# Patient Record
Sex: MALE | Race: WHITE | NOT HISPANIC OR LATINO | Employment: OTHER | URBAN - METROPOLITAN AREA
[De-identification: names, ages, dates, MRNs, and addresses within clinical notes are randomized per-mention and may not be internally consistent; named-entity substitution may affect disease eponyms.]

---

## 2020-07-02 ENCOUNTER — APPOINTMENT (OUTPATIENT)
Dept: RADIOLOGY | Facility: OTHER | Age: 60
End: 2020-07-02
Payer: COMMERCIAL

## 2020-07-02 VITALS
WEIGHT: 187 LBS | BODY MASS INDEX: 25.33 KG/M2 | SYSTOLIC BLOOD PRESSURE: 164 MMHG | DIASTOLIC BLOOD PRESSURE: 90 MMHG | HEART RATE: 60 BPM | HEIGHT: 72 IN

## 2020-07-02 DIAGNOSIS — M17.11 PRIMARY OSTEOARTHRITIS OF RIGHT KNEE: ICD-10-CM

## 2020-07-02 DIAGNOSIS — R52 PAIN: ICD-10-CM

## 2020-07-02 DIAGNOSIS — M25.561 CHRONIC PAIN OF RIGHT KNEE: Primary | ICD-10-CM

## 2020-07-02 DIAGNOSIS — G89.29 CHRONIC PAIN OF RIGHT KNEE: Primary | ICD-10-CM

## 2020-07-02 PROCEDURE — 73564 X-RAY EXAM KNEE 4 OR MORE: CPT

## 2020-07-02 PROCEDURE — 99203 OFFICE O/P NEW LOW 30 MIN: CPT | Performed by: ORTHOPAEDIC SURGERY

## 2020-07-02 PROCEDURE — 20610 DRAIN/INJ JOINT/BURSA W/O US: CPT | Performed by: ORTHOPAEDIC SURGERY

## 2020-07-02 RX ORDER — BUPIVACAINE HYDROCHLORIDE 2.5 MG/ML
2 INJECTION, SOLUTION INFILTRATION; PERINEURAL
Status: COMPLETED | OUTPATIENT
Start: 2020-07-02 | End: 2020-07-02

## 2020-07-02 RX ORDER — BETAMETHASONE SODIUM PHOSPHATE AND BETAMETHASONE ACETATE 3; 3 MG/ML; MG/ML
6 INJECTION, SUSPENSION INTRA-ARTICULAR; INTRALESIONAL; INTRAMUSCULAR; SOFT TISSUE
Status: COMPLETED | OUTPATIENT
Start: 2020-07-02 | End: 2020-07-02

## 2020-07-02 RX ADMIN — BUPIVACAINE HYDROCHLORIDE 2 ML: 2.5 INJECTION, SOLUTION INFILTRATION; PERINEURAL at 09:20

## 2020-07-02 RX ADMIN — BETAMETHASONE SODIUM PHOSPHATE AND BETAMETHASONE ACETATE 6 MG: 3; 3 INJECTION, SUSPENSION INTRA-ARTICULAR; INTRALESIONAL; INTRAMUSCULAR; SOFT TISSUE at 09:20

## 2020-07-02 NOTE — PROGRESS NOTES
Assessment  Diagnoses and all orders for this visit:    Chronic pain of right knee    Primary osteoarthritis of right knee          Discussion and Plan:    The patient has an examination consistent with right knee OA and valgus deformity  I have discussed with the patient the pathophysiology of this diagnosis and reviewed how the examination correlates with this diagnosis  Treatment options were discussed at length and after discussing these treatment options, the patient elected for right knee injection today  If CS injection does not work we can consider visco supplementation  Injection can be repeated in 4-6 mos if needed  Subjective:   Patient ID: Thierno Rubalcava is a 63 y o  male      HPI  The patient presents with a chief complaint of right knee pain  The pain began several year(s) ago and is not associated with an acute injury  The patient describes the pain as aching and dull  It is intermittent in timing, and localizes the pain to the globally  The pain is worse with walking, ascending stairs and descending stairs and relieved with rest   He denies mechanical symptoms such as locking and catching  The following portions of the patient's history were reviewed and updated as appropriate: allergies, current medications, past family history, past medical history, past social history, past surgical history and problem list     Review of Systems   Constitutional: Negative for chills and fever  HENT: Negative for drooling and hearing loss  Eyes: Negative for visual disturbance  Respiratory: Negative for cough and shortness of breath  Cardiovascular: Negative for chest pain  Gastrointestinal: Negative for abdominal pain  Skin: Negative for rash  Psychiatric/Behavioral: Negative for agitation         Objective:  /90   Pulse 60   Ht 6' (1 829 m)   Wt 84 8 kg (187 lb)   BMI 25 36 kg/m²       Right Knee Exam     Tenderness   The patient is experiencing tenderness in the medial joint line  Range of Motion   Extension: 0   Flexion: 130     Other   Erythema: absent  Sensation: normal  Pulse: present  Swelling: none  Effusion: no effusion present    Comments:    Valgus alinement  Laxity with valgus and varus stress testing            Physical Exam   Constitutional: He appears well-developed and well-nourished  HENT:   Head: Normocephalic  Eyes: Pupils are equal, round, and reactive to light  Pulmonary/Chest: Effort normal    Musculoskeletal:        Right knee: He exhibits no effusion  Skin: Skin is warm and dry  Psychiatric: He has a normal mood and affect  Vitals reviewed  Large joint arthrocentesis: R knee  Date/Time: 7/2/2020 9:20 AM  Consent given by: parent  Site marked: site marked  Timeout: Immediately prior to procedure a time out was called to verify the correct patient, procedure, equipment, support staff and site/side marked as required   Supporting Documentation  Indications: pain   Procedure Details  Location: knee - R knee  Preparation: Patient was prepped and draped in the usual sterile fashion  Needle size: 22 G  Ultrasound guidance: no  Approach: lateral  Medications administered: 2 mL bupivacaine 0 25 %; 6 mg betamethasone acetate-betamethasone sodium phosphate 6 (3-3) mg/mL    Patient tolerance: patient tolerated the procedure well with no immediate complications  Dressing:  Sterile dressing applied        I have personally reviewed pertinent films in PACS and my interpretation is as follows  Right knee x-rays demonstrates no fracture of dislocation    There is advanced osteoarthritis of the right knee with valgus deformity, the arthritis is mostly localized the lateral compartment but is present in the medial and patellofemoral compartment as well        Scribe Attestation    I,:   Victor Hugo Vera am acting as a scribe while in the presence of the attending physician :        I,:   Shireen Hayes MD personally performed the services described in this documentation    as scribed in my presence :

## 2020-07-02 NOTE — PATIENT INSTRUCTIONS

## 2020-07-24 DIAGNOSIS — M17.11 PRIMARY OSTEOARTHRITIS OF RIGHT KNEE: Primary | ICD-10-CM

## 2020-07-24 NOTE — TELEPHONE ENCOUNTER
Allan Fam Median    Patient states cortisone is not working, he would like to proceed with gel injection authorization previously discussed

## 2020-10-01 ENCOUNTER — OFFICE VISIT (OUTPATIENT)
Dept: OBGYN CLINIC | Facility: OTHER | Age: 60
End: 2020-10-01
Payer: COMMERCIAL

## 2020-10-01 VITALS
DIASTOLIC BLOOD PRESSURE: 89 MMHG | BODY MASS INDEX: 26.14 KG/M2 | HEART RATE: 54 BPM | WEIGHT: 193 LBS | SYSTOLIC BLOOD PRESSURE: 151 MMHG | HEIGHT: 72 IN

## 2020-10-01 DIAGNOSIS — M17.11 PRIMARY OSTEOARTHRITIS OF RIGHT KNEE: Primary | ICD-10-CM

## 2020-10-01 PROCEDURE — 20610 DRAIN/INJ JOINT/BURSA W/O US: CPT | Performed by: PHYSICIAN ASSISTANT

## 2021-08-05 ENCOUNTER — OFFICE VISIT (OUTPATIENT)
Dept: OBGYN CLINIC | Facility: OTHER | Age: 61
End: 2021-08-05
Payer: COMMERCIAL

## 2021-08-05 VITALS
HEART RATE: 51 BPM | SYSTOLIC BLOOD PRESSURE: 154 MMHG | BODY MASS INDEX: 26.31 KG/M2 | DIASTOLIC BLOOD PRESSURE: 87 MMHG | WEIGHT: 194 LBS

## 2021-08-05 DIAGNOSIS — M17.11 PRIMARY OSTEOARTHRITIS OF RIGHT KNEE: Primary | ICD-10-CM

## 2021-08-05 DIAGNOSIS — M17.12 PRIMARY OSTEOARTHRITIS OF LEFT KNEE: ICD-10-CM

## 2021-08-05 PROCEDURE — 20610 DRAIN/INJ JOINT/BURSA W/O US: CPT | Performed by: ORTHOPAEDIC SURGERY

## 2021-08-05 PROCEDURE — 99214 OFFICE O/P EST MOD 30 MIN: CPT | Performed by: ORTHOPAEDIC SURGERY

## 2021-08-05 RX ORDER — BETAMETHASONE SODIUM PHOSPHATE AND BETAMETHASONE ACETATE 3; 3 MG/ML; MG/ML
6 INJECTION, SUSPENSION INTRA-ARTICULAR; INTRALESIONAL; INTRAMUSCULAR; SOFT TISSUE
Status: COMPLETED | OUTPATIENT
Start: 2021-08-05 | End: 2021-08-05

## 2021-08-05 RX ORDER — BUPIVACAINE HYDROCHLORIDE 2.5 MG/ML
2 INJECTION, SOLUTION INFILTRATION; PERINEURAL
Status: COMPLETED | OUTPATIENT
Start: 2021-08-05 | End: 2021-08-05

## 2021-08-05 RX ADMIN — BETAMETHASONE SODIUM PHOSPHATE AND BETAMETHASONE ACETATE 6 MG: 3; 3 INJECTION, SUSPENSION INTRA-ARTICULAR; INTRALESIONAL; INTRAMUSCULAR; SOFT TISSUE at 10:23

## 2021-08-05 RX ADMIN — BUPIVACAINE HYDROCHLORIDE 2 ML: 2.5 INJECTION, SOLUTION INFILTRATION; PERINEURAL at 10:23

## 2021-08-05 NOTE — PROGRESS NOTES
Assessment  Diagnoses and all orders for this visit:    Primary osteoarthritis of right knee    Primary osteoarthritis of left knee        Discussion and Plan:    · Patient was provided with repeat CS injections today in to his bilateral knees for known osteoarthritis  This is documented appropriately below  · Informed the patient that he should make a follow up visit with Dr Jung Cervantes if injections do not provide him with benefit for a further discussion about a possible total knee arthroplasty  He understood and all questions were answered  · Follow up on an as needed basis    Subjective:   Patient ID: Megha Phoenix is a 57 y o  male      56 yo male who presents today for a follow up visit for his bilateral knees  Right worse than left  Patient has treated in the past for now osteoarthritis about his right knee with CS and visco injections, last begin a Monovisc injection into his right knee on 10/1/2020 with benefit  Today, he is noting increased pain about both knees  Pain is localized about the medial aspect of the left knee and about the anterolateral aspect of the right knee  He states that his pain is worse ambulating and standing, especially for prolonged periods of time  No numbness or tingling  No fevers or chills  The following portions of the patient's history were reviewed and updated as appropriate: allergies, current medications, past family history, past medical history, past social history, past surgical history and problem list     Review of Systems   Constitutional: Negative for chills, fatigue, fever and unexpected weight change  HENT: Negative for hearing loss, nosebleeds and sore throat  Eyes: Negative for pain, redness and visual disturbance  Respiratory: Negative for cough, shortness of breath and wheezing  Cardiovascular: Negative for chest pain, palpitations and leg swelling  Gastrointestinal: Negative for abdominal pain, nausea and vomiting     Endocrine: Negative for polydipsia and polyuria  Genitourinary: Negative for frequency and urgency  Skin: Negative for color change, rash and wound  Neurological: Negative for dizziness, weakness, numbness and headaches  Psychiatric/Behavioral: Negative for behavioral problems, self-injury and suicidal ideas  Objective:  /87 (BP Location: Left arm, Patient Position: Sitting, Cuff Size: Adult)   Pulse (!) 51   Wt 88 kg (194 lb)   BMI 26 31 kg/m²       Right Knee Exam     Tenderness   The patient is experiencing tenderness in the lateral joint line  Range of Motion   Extension: 0   Flexion: 120     Tests   Varus: negative Valgus: negative    Other   Erythema: absent  Sensation: normal  Pulse: present  Effusion: no effusion present      Left Knee Exam     Tenderness   The patient is experiencing tenderness in the medial joint line  Range of Motion   Extension: 0   Flexion: 120     Tests   Varus: negative Valgus: negative    Other   Erythema: absent  Sensation: normal  Pulse: present  Effusion: no effusion present              Physical Exam  Constitutional:       General: He is not in acute distress  Appearance: He is well-developed  Eyes:      Conjunctiva/sclera: Conjunctivae normal       Pupils: Pupils are equal, round, and reactive to light  Cardiovascular:      Rate and Rhythm: Normal rate and regular rhythm  Pulmonary:      Effort: Pulmonary effort is normal       Breath sounds: Normal breath sounds  Abdominal:      General: Bowel sounds are normal       Palpations: Abdomen is soft  Musculoskeletal:      Cervical back: Normal range of motion and neck supple  Right knee: No effusion  Left knee: No effusion  Skin:     General: Skin is warm and dry  Findings: No erythema or rash  Neurological:      Mental Status: He is alert and oriented to person, place, and time  Deep Tendon Reflexes: Reflexes are normal and symmetric     Psychiatric:         Behavior: Behavior normal        Large joint arthrocentesis: bilateral knee  Universal Protocol:  Consent given by: patient    Supporting Documentation  Indications: pain and diagnostic evaluation   Procedure Details  Location: knee - bilateral knee  Preparation: Patient was prepped and draped in the usual sterile fashion  Needle size: 22 G  Ultrasound guidance: no  Approach: lateral    Medications (Right): 2 mL bupivacaine 0 25 %; 6 mg betamethasone acetate-betamethasone sodium phosphate 6 (3-3) mg/mLMedications (Left): 2 mL bupivacaine 0 25 %; 6 mg betamethasone acetate-betamethasone sodium phosphate 6 (3-3) mg/mL   Patient tolerance: patient tolerated the procedure well with no immediate complications  Dressing:  Sterile dressing applied          I have personally reviewed pertinent films in PACS and my interpretation is as follows  X Ray Bilateral Knees 7/2/2020: Bilateral tricompartmental osteoarthritis, most pronounced in the lateral compartment of the right knee and medial compartment of the left knee       Scribe Attestation    I,:  Jackie Maxwell am acting as a scribe while in the presence of the attending physician :       I,:  Andrew Cardenas MD personally performed the services described in this documentation    as scribed in my presence :

## 2021-08-05 NOTE — PATIENT INSTRUCTIONS

## 2021-09-16 VITALS
SYSTOLIC BLOOD PRESSURE: 159 MMHG | BODY MASS INDEX: 26.14 KG/M2 | HEIGHT: 72 IN | WEIGHT: 193 LBS | DIASTOLIC BLOOD PRESSURE: 97 MMHG | HEART RATE: 62 BPM

## 2021-09-16 DIAGNOSIS — M17.11 PRIMARY OSTEOARTHRITIS OF RIGHT KNEE: Primary | ICD-10-CM

## 2021-09-16 DIAGNOSIS — Z96.651 AFTERCARE FOLLOWING RIGHT KNEE JOINT REPLACEMENT SURGERY: ICD-10-CM

## 2021-09-16 DIAGNOSIS — M17.12 PRIMARY OSTEOARTHRITIS OF LEFT KNEE: ICD-10-CM

## 2021-09-16 DIAGNOSIS — M25.561 CHRONIC PAIN OF RIGHT KNEE: ICD-10-CM

## 2021-09-16 DIAGNOSIS — Z47.1 AFTERCARE FOLLOWING RIGHT KNEE JOINT REPLACEMENT SURGERY: ICD-10-CM

## 2021-09-16 DIAGNOSIS — G89.29 CHRONIC PAIN OF RIGHT KNEE: ICD-10-CM

## 2021-09-16 PROCEDURE — 3008F BODY MASS INDEX DOCD: CPT | Performed by: ORTHOPAEDIC SURGERY

## 2021-09-16 PROCEDURE — 99214 OFFICE O/P EST MOD 30 MIN: CPT | Performed by: ORTHOPAEDIC SURGERY

## 2021-09-16 PROCEDURE — 1036F TOBACCO NON-USER: CPT | Performed by: ORTHOPAEDIC SURGERY

## 2021-09-16 RX ORDER — CEFAZOLIN SODIUM 2 G/50ML
2000 SOLUTION INTRAVENOUS ONCE
Status: CANCELLED | OUTPATIENT
Start: 2021-09-16 | End: 2021-09-16

## 2021-09-16 RX ORDER — ASCORBIC ACID 500 MG
500 TABLET ORAL 2 TIMES DAILY
Qty: 60 TABLET | Refills: 0 | Status: SHIPPED | OUTPATIENT
Start: 2021-09-16 | End: 2022-01-25

## 2021-09-16 RX ORDER — CHLORHEXIDINE GLUCONATE 0.12 MG/ML
15 RINSE ORAL ONCE
Status: CANCELLED | OUTPATIENT
Start: 2021-09-16 | End: 2021-09-16

## 2021-09-16 RX ORDER — FOLIC ACID 1 MG/1
1 TABLET ORAL DAILY
Qty: 30 TABLET | Refills: 0 | Status: SHIPPED | OUTPATIENT
Start: 2021-09-16 | End: 2022-01-25

## 2021-09-16 RX ORDER — FERROUS SULFATE TAB EC 324 MG (65 MG FE EQUIVALENT) 324 (65 FE) MG
324 TABLET DELAYED RESPONSE ORAL
Qty: 30 TABLET | Refills: 0 | Status: SHIPPED | OUTPATIENT
Start: 2021-09-16 | End: 2022-01-25

## 2021-09-16 NOTE — PROGRESS NOTES
Assessment  Problem List Items Addressed This Visit        Musculoskeletal and Integument    Primary osteoarthritis of right knee - Primary    Relevant Medications    ascorbic acid (VITAMIN C) 500 MG tablet    ferrous sulfate 324 (65 Fe) mg    folic acid (FOLVITE) 1 mg tablet    Other Relevant Orders    Comprehensive metabolic panel    Hemoglobin A1C W/EAG Estimation    CBC and differential    Anemia Panel w/Reflex    Protime-INR    APTT    Type and screen    Ambulatory referral to Family Practice    Ambulatory referral to Physical Therapy    Case request operating room: ARTHROPLASTY KNEE TOTAL (Completed)    EKG 12 lead    PAT Covid Screening    Primary osteoarthritis of left knee      Other Visit Diagnoses     Chronic pain of right knee        Aftercare following right knee joint replacement surgery        Relevant Medications    enoxaparin (LOVENOX) 40 mg/0 4 mL          Discussion and Plan:      Presents for evaluation of bilateral knee pain secondary to known osteoarthritis  Patient has failed conservative treatment measures such as corticosteroid injections at-home physical therapy without any significant pain relief  His right knee is worse than his left and overall his knee pain has significantly influenced his ability to perform his daily functions  After detailed discussion with the patient, the  Risk and benefits of undergoing elective right total knee arthroplasty were discussed  Patient wished to proceed with elective knee replacement and informed consent was obtained  No guarantees were given  Patient was prescribed folic acid, vitamin-C, and iron to be taken 30 days prior to his date of surgery  Additionally was prescribed a 28 day course of Lovenox to be started postoperatively for DVT prophylaxis  He be scheduled for 1 week follow-up following his date of surgery      Subjective:   Patient ID: Marbin Chowdhury is a 57 y o  male      Presents for evaluation of bilateral knee pain secondary to known osteoarthritis  His right knee is worse than his left but both are chronic in nature  His knee pain is exacerbated by activity relieved by rest   Specifically knee flexion activities such as ascending and descending stairs, squatting and exercising at his pain  He has received corticosteroid injections in at-home physical therapy without any relief of his knee pain  At is debilitating at times and significantly influences ability to perform his daily functions  With regards to his right knee, his pain is laterally based and with rest his left knee it is medially based  He has a remote history of a left knee arthroscopy with meniscal repair  The following portions of the patient's history were reviewed and updated as appropriate: allergies, current medications, past family history, past medical history, past social history, past surgical history and problem list     Review of Systems   Constitutional: Negative for fever  HENT: Negative for congestion  Eyes: Negative for photophobia  Respiratory: Negative for shortness of breath  Cardiovascular: Negative for chest pain  Gastrointestinal: Negative for nausea and vomiting  Musculoskeletal: Positive for arthralgias  Skin: Negative for rash  Allergic/Immunologic: Negative for immunocompromised state  Neurological: Negative for headaches  Psychiatric/Behavioral: Negative for behavioral problems  Objective:  /97   Pulse 62   Ht 6' (1 829 m)   Wt 87 5 kg (193 lb)   BMI 26 18 kg/m²       Right Ankle Exam     Comments:  Bilateral   Skin intact   No effusion or erythema   Valgus alignment   Right  Varus alignment left  Left Medial joint line and Right Lateral joint line TTP   Normal strength   Good range of motion  Calf compartment soft and supple  Sensation intact  Toes are warm well perfused              Physical Exam  Vitals reviewed  HENT:      Head: Normocephalic        Right Ear: External ear normal       Left Ear: External ear normal       Nose: Nose normal       Mouth/Throat:      Pharynx: Oropharynx is clear  Eyes:      Pupils: Pupils are equal, round, and reactive to light  Cardiovascular:      Rate and Rhythm: Normal rate  Pulses: Normal pulses  Pulmonary:      Effort: Pulmonary effort is normal    Abdominal:      Palpations: Abdomen is soft  Musculoskeletal:      Cervical back: Normal range of motion  Comments: Please see ortho exam    Skin:     Capillary Refill: Capillary refill takes less than 2 seconds  Neurological:      Mental Status: He is alert  Mental status is at baseline  Psychiatric:         Mood and Affect: Mood normal            I have personally reviewed pertinent films in PACS and my interpretation is as follows  bilateral standing x-rays of the knees showing lateral joint space narrowing with bone-on-bone contact on the right and medial functions narrowing on the left

## 2021-10-10 DIAGNOSIS — M17.11 PRIMARY OSTEOARTHRITIS OF RIGHT KNEE: ICD-10-CM

## 2021-12-07 PROBLEM — Z90.81 H/O SPLENECTOMY: Status: ACTIVE | Noted: 2021-10-02

## 2021-12-07 PROBLEM — I10 HYPERTENSION, ESSENTIAL: Status: ACTIVE | Noted: 2021-09-29

## 2021-12-07 RX ORDER — FOLIC ACID 1 MG/1
1000 TABLET ORAL DAILY
COMMUNITY
Start: 2021-09-16 | End: 2021-12-07 | Stop reason: SDUPTHER

## 2021-12-07 RX ORDER — LISINOPRIL 10 MG/1
10 TABLET ORAL DAILY
COMMUNITY
Start: 2021-10-21

## 2021-12-13 ENCOUNTER — OFFICE VISIT (OUTPATIENT)
Dept: LAB | Facility: HOSPITAL | Age: 61
End: 2021-12-13
Payer: COMMERCIAL

## 2021-12-13 ENCOUNTER — APPOINTMENT (OUTPATIENT)
Dept: LAB | Facility: HOSPITAL | Age: 61
End: 2021-12-13
Payer: COMMERCIAL

## 2021-12-13 ENCOUNTER — OFFICE VISIT (OUTPATIENT)
Dept: INTERNAL MEDICINE CLINIC | Facility: CLINIC | Age: 61
End: 2021-12-13
Payer: COMMERCIAL

## 2021-12-13 VITALS
SYSTOLIC BLOOD PRESSURE: 135 MMHG | HEART RATE: 50 BPM | WEIGHT: 196.5 LBS | DIASTOLIC BLOOD PRESSURE: 82 MMHG | BODY MASS INDEX: 26.61 KG/M2 | HEIGHT: 72 IN

## 2021-12-13 DIAGNOSIS — I10 HYPERTENSION, ESSENTIAL: ICD-10-CM

## 2021-12-13 DIAGNOSIS — M17.11 PRIMARY OSTEOARTHRITIS OF RIGHT KNEE: ICD-10-CM

## 2021-12-13 DIAGNOSIS — Z90.81 H/O SPLENECTOMY: ICD-10-CM

## 2021-12-13 DIAGNOSIS — M17.11 PRIMARY OSTEOARTHRITIS OF RIGHT KNEE: Primary | ICD-10-CM

## 2021-12-13 DIAGNOSIS — Z01.818 PREOP EXAM FOR INTERNAL MEDICINE: ICD-10-CM

## 2021-12-13 LAB
ABO GROUP BLD: NORMAL
ALBUMIN SERPL BCP-MCNC: 3.8 G/DL (ref 3.5–5)
ALP SERPL-CCNC: 60 U/L (ref 46–116)
ALT SERPL W P-5'-P-CCNC: 33 U/L (ref 12–78)
ANION GAP SERPL CALCULATED.3IONS-SCNC: 3 MMOL/L (ref 4–13)
APTT PPP: 28 SECONDS (ref 23–37)
AST SERPL W P-5'-P-CCNC: 19 U/L (ref 5–45)
BASOPHILS # BLD AUTO: 0.05 THOUSANDS/ΜL (ref 0–0.1)
BASOPHILS NFR BLD AUTO: 1 % (ref 0–1)
BILIRUB SERPL-MCNC: 0.49 MG/DL (ref 0.2–1)
BLD GP AB SCN SERPL QL: NEGATIVE
BUN SERPL-MCNC: 17 MG/DL (ref 5–25)
CALCIUM SERPL-MCNC: 9.2 MG/DL (ref 8.3–10.1)
CHLORIDE SERPL-SCNC: 107 MMOL/L (ref 100–108)
CO2 SERPL-SCNC: 31 MMOL/L (ref 21–32)
CREAT SERPL-MCNC: 0.82 MG/DL (ref 0.6–1.3)
EOSINOPHIL # BLD AUTO: 0.13 THOUSAND/ΜL (ref 0–0.61)
EOSINOPHIL NFR BLD AUTO: 2 % (ref 0–6)
ERYTHROCYTE [DISTWIDTH] IN BLOOD BY AUTOMATED COUNT: 14.5 % (ref 11.6–15.1)
EST. AVERAGE GLUCOSE BLD GHB EST-MCNC: 108 MG/DL
GFR SERPL CREATININE-BSD FRML MDRD: 95 ML/MIN/1.73SQ M
GLUCOSE P FAST SERPL-MCNC: 94 MG/DL (ref 65–99)
HBA1C MFR BLD: 5.4 %
HCT VFR BLD AUTO: 44.9 % (ref 36.5–49.3)
HGB BLD-MCNC: 14.8 G/DL (ref 12–17)
IMM GRANULOCYTES # BLD AUTO: 0.02 THOUSAND/UL (ref 0–0.2)
IMM GRANULOCYTES NFR BLD AUTO: 0 % (ref 0–2)
INR PPP: 1 (ref 0.84–1.19)
LYMPHOCYTES # BLD AUTO: 2.92 THOUSANDS/ΜL (ref 0.6–4.47)
LYMPHOCYTES NFR BLD AUTO: 37 % (ref 14–44)
MCH RBC QN AUTO: 31.2 PG (ref 26.8–34.3)
MCHC RBC AUTO-ENTMCNC: 33 G/DL (ref 31.4–37.4)
MCV RBC AUTO: 95 FL (ref 82–98)
MONOCYTES # BLD AUTO: 0.86 THOUSAND/ΜL (ref 0.17–1.22)
MONOCYTES NFR BLD AUTO: 11 % (ref 4–12)
NEUTROPHILS # BLD AUTO: 3.82 THOUSANDS/ΜL (ref 1.85–7.62)
NEUTS SEG NFR BLD AUTO: 49 % (ref 43–75)
NRBC BLD AUTO-RTO: 0 /100 WBCS
PLATELET # BLD AUTO: 267 THOUSANDS/UL (ref 149–390)
PMV BLD AUTO: 10.1 FL (ref 8.9–12.7)
POTASSIUM SERPL-SCNC: 5.3 MMOL/L (ref 3.5–5.3)
PROT SERPL-MCNC: 7.4 G/DL (ref 6.4–8.2)
PROTHROMBIN TIME: 12.8 SECONDS (ref 11.6–14.5)
RBC # BLD AUTO: 4.75 MILLION/UL (ref 3.88–5.62)
RH BLD: POSITIVE
SODIUM SERPL-SCNC: 141 MMOL/L (ref 136–145)
SPECIMEN EXPIRATION DATE: NORMAL
WBC # BLD AUTO: 7.8 THOUSAND/UL (ref 4.31–10.16)

## 2021-12-13 PROCEDURE — 85025 COMPLETE CBC W/AUTO DIFF WBC: CPT

## 2021-12-13 PROCEDURE — 80053 COMPREHEN METABOLIC PANEL: CPT

## 2021-12-13 PROCEDURE — 83036 HEMOGLOBIN GLYCOSYLATED A1C: CPT

## 2021-12-13 PROCEDURE — 86901 BLOOD TYPING SEROLOGIC RH(D): CPT

## 2021-12-13 PROCEDURE — 93005 ELECTROCARDIOGRAM TRACING: CPT

## 2021-12-13 PROCEDURE — 36415 COLL VENOUS BLD VENIPUNCTURE: CPT

## 2021-12-13 PROCEDURE — 85730 THROMBOPLASTIN TIME PARTIAL: CPT

## 2021-12-13 PROCEDURE — 85610 PROTHROMBIN TIME: CPT

## 2021-12-13 PROCEDURE — 86900 BLOOD TYPING SEROLOGIC ABO: CPT

## 2021-12-13 PROCEDURE — 99214 OFFICE O/P EST MOD 30 MIN: CPT | Performed by: INTERNAL MEDICINE

## 2021-12-13 PROCEDURE — 86850 RBC ANTIBODY SCREEN: CPT

## 2021-12-14 LAB
ATRIAL RATE: 46 BPM
P AXIS: 35 DEGREES
PR INTERVAL: 206 MS
QRS AXIS: -19 DEGREES
QRSD INTERVAL: 102 MS
QT INTERVAL: 476 MS
QTC INTERVAL: 416 MS
T WAVE AXIS: -19 DEGREES
VENTRICULAR RATE: 46 BPM

## 2021-12-14 PROCEDURE — 93010 ELECTROCARDIOGRAM REPORT: CPT | Performed by: INTERNAL MEDICINE

## 2021-12-28 ENCOUNTER — EVALUATION (OUTPATIENT)
Dept: PHYSICAL THERAPY | Facility: CLINIC | Age: 61
End: 2021-12-28
Payer: COMMERCIAL

## 2021-12-28 DIAGNOSIS — M17.11 PRIMARY OSTEOARTHRITIS OF RIGHT KNEE: Primary | ICD-10-CM

## 2021-12-28 PROCEDURE — 97161 PT EVAL LOW COMPLEX 20 MIN: CPT | Performed by: PHYSICAL THERAPIST

## 2021-12-28 PROCEDURE — 97110 THERAPEUTIC EXERCISES: CPT | Performed by: PHYSICAL THERAPIST

## 2021-12-28 NOTE — PRE-PROCEDURE INSTRUCTIONS
Pre-Surgery Instructions:   Medication Instructions    ascorbic acid (VITAMIN C) 500 MG tablet Instructed to take per normal schedule except DOS    ferrous sulfate 324 (65 Fe) mg Instructed to take per normal schedule except DOS    folic acid (FOLVITE) 1 mg tablet Instructed to take per normal schedule except DOS    lisinopril (ZESTRIL) 10 mg tablet Instructed patient per Anesthesia Guidelines  hold day prior and DOS    Multiple Vitamin (MULTIVITAMIN ADULT PO) Instructed to avoid all ASA and OTC Vit/Supp 1 week prior to surgery and to avoid NSAIDs 3 days prior to surgery per anesthesia instructions  Tylenol ok to take prn   Multiple Vitamins-Minerals (PRESERVISION AREDS 2 PO) Instructed to avoid all ASA and OTC Vit/Supp 1 week prior to surgery and to avoid NSAIDs 3 days prior to surgery per anesthesia instructions  Tylenol ok to take prn  Have you had / have a sore throat? no  Have you had / have a cough less than 1 week? no  Have you had / have a fever greater than 100 0 - 100  4? no  Are you experiencing any shortness of breath? No  Fully vaccinated  4/14/2021, 3/17/2021    Reviewed with patient, in detail, instructions from "My Surgical Experience"  Critical access hospital patient education reviewed with all questions answered  Pre-op instructions per [facility] protocol,medications per surgeon/anesthesia guidelines and showering instructions per [facility] protocol reviewed  Patient has hibiclens soap and CHG wipes  Incentive spirometer use reviewed  Patient taking Folic Acid, Vitamin C, and Iron as ordered by surgeon  Patient verbalized understanding of current visitor restrictions due to Covid and will clarify with nurse DOS  Instructed to avoid all ASA and OTC Vit/Supp 1 week prior to surgery and to avoid NSAIDs 3 days prior to surgery per anesthesia guidelines  Tylenol ok to take prn  No alcohol 24 hours prior to surgery  Patient aware Lovenox or other Blood Thinner prescribed is for POST OP ONLY   Patient verbalized an understanding of all instructions reviewed and offers no concerns at this time  My Surgical Experience    The following information was developed to assist you to prepare for your operation  What do I need to do before coming to the hospital?   Arrange for a responsible person to drive you to and from the hospital    Arrange care for your children at home  Children are not allowed in the recovery areas of the hospital   Plan to wear clothing that is easy to put on and take off  If you are having shoulder surgery, wear a shirt that buttons or zippers in the front  Bathing  o Shower the evening before and the morning of your surgery with an antibacterial soap  Please refer to the Pre Op Showering Instructions for Surgery Patients Sheet   o Remove nail polish and all body piercing jewelry  o Do not shave any body part for at least 24 hours before surgery-this includes face, arms, legs and upper body  Food  o Nothing to eat or drink after midnight the night before your surgery  This includes candy and chewing gum  o Exception: If your surgery is after 12:00pm (noon), you may have clear liquids such as 7-Up®, ginger ale, apple or cranberry juice, Jell-O®, water, or clear broth until 8:00 am  o Do not drink milk or juice with pulp on the morning before surgery  o Do not drink alcohol 24 hours before surgery  Medicine  o Follow instructions you received from your surgeon about which medicines you may take on the day of surgery  o If instructed to take medicine on the morning of surgery, take pills with just a small sip of water  Call your prescribing doctor for specific information on what to do if you take insulin    What should I bring to the hospital?    Bring:  Cely Sanchez or a walker, if you have them, for foot or knee surgery   A list of the daily medicines, vitamins, minerals, herbals and nutritional supplements you take   Include the dosages of medicines and the time you take them each day   Glasses, dentures or hearing aids   Minimal clothing; you will be wearing hospital sleepwear   Photo ID; required to verify your identity   If you have a Living Will or Power of , bring a copy of the documents   If you have an ostomy, bring an extra pouch and any supplies you use    Do not bring   Medicines or inhalers   Money, valuables or jewelry    What other information should I know about the day of surgery?  Notify your surgeons if you develop a cold, sore throat, cough, fever, rash or any other illness   Report to the Ambulatory Surgical/Same Day Surgery Unit   You will be instructed to stop at Registration only if you have not been pre-registered   Inform your  fi they do not stay that they will be asked by the staff to leave a phone number where they can be reached   Be available to be reached before surgery  In the event the operating room schedule changes, you may be asked to come in earlier or later than expected    *It is important to tell your doctor and others involved in your health care if you are taking or have been taking any non-prescription drugs, vitamins, minerals, herbals or other nutritional supplements   Any of these may interact with some food or medicines and cause a reaction

## 2022-01-09 ENCOUNTER — ANESTHESIA EVENT (OUTPATIENT)
Dept: PERIOP | Facility: HOSPITAL | Age: 62
End: 2022-01-09
Payer: COMMERCIAL

## 2022-01-10 ENCOUNTER — HOSPITAL ENCOUNTER (OUTPATIENT)
Facility: HOSPITAL | Age: 62
Setting detail: OUTPATIENT SURGERY
Discharge: HOME | End: 2022-01-11
Attending: ORTHOPAEDIC SURGERY | Admitting: ORTHOPAEDIC SURGERY
Payer: COMMERCIAL

## 2022-01-10 ENCOUNTER — ANESTHESIA (OUTPATIENT)
Dept: PERIOP | Facility: HOSPITAL | Age: 62
End: 2022-01-10
Payer: COMMERCIAL

## 2022-01-10 DIAGNOSIS — Z96.651 S/P TOTAL KNEE ARTHROPLASTY, RIGHT: Primary | ICD-10-CM

## 2022-01-10 LAB
ABO GROUP BLD: NORMAL
RH BLD: POSITIVE

## 2022-01-10 PROCEDURE — S2900 ROBOTIC SURGICAL SYSTEM: HCPCS | Performed by: ORTHOPAEDIC SURGERY

## 2022-01-10 PROCEDURE — C1776 JOINT DEVICE (IMPLANTABLE): HCPCS | Performed by: ORTHOPAEDIC SURGERY

## 2022-01-10 PROCEDURE — 27447 TOTAL KNEE ARTHROPLASTY: CPT | Performed by: ORTHOPAEDIC SURGERY

## 2022-01-10 PROCEDURE — 99215 OFFICE O/P EST HI 40 MIN: CPT | Performed by: INTERNAL MEDICINE

## 2022-01-10 PROCEDURE — NC001 PR NO CHARGE: Performed by: ORTHOPAEDIC SURGERY

## 2022-01-10 PROCEDURE — 27447 TOTAL KNEE ARTHROPLASTY: CPT | Performed by: PHYSICIAN ASSISTANT

## 2022-01-10 PROCEDURE — C1713 ANCHOR/SCREW BN/BN,TIS/BN: HCPCS | Performed by: ORTHOPAEDIC SURGERY

## 2022-01-10 PROCEDURE — 97163 PT EVAL HIGH COMPLEX 45 MIN: CPT

## 2022-01-10 PROCEDURE — C9290 INJ, BUPIVACAINE LIPOSOME: HCPCS | Performed by: ANESTHESIOLOGY

## 2022-01-10 DEVICE — SMARTSET HV HIGH VISCOSITY BONE CEMENT 40G
Type: IMPLANTABLE DEVICE | Site: KNEE | Status: FUNCTIONAL
Brand: SMARTSET

## 2022-01-10 DEVICE — ATTUNE KNEE SYSTEM FEMORAL POSTERIOR STABILIZED SIZE 7 RIGHT CEMENTED
Type: IMPLANTABLE DEVICE | Site: KNEE | Status: FUNCTIONAL
Brand: ATTUNE

## 2022-01-10 DEVICE — ATTUNE PATELLA MEDIALIZED DOME 38MM CEMENTED AOX
Type: IMPLANTABLE DEVICE | Site: KNEE | Status: FUNCTIONAL
Brand: ATTUNE

## 2022-01-10 DEVICE — ATTUNE KNEE SYSTEM TIBIAL INSERT FIXED BEARING POSTERIOR STABILIZED 7 7MM AOX
Type: IMPLANTABLE DEVICE | Site: KNEE | Status: FUNCTIONAL
Brand: ATTUNE

## 2022-01-10 DEVICE — ATTUNE KNEE SYSTEM TIBIAL BASE FIXED BEARING SIZE 7 CEMENTED
Type: IMPLANTABLE DEVICE | Site: KNEE | Status: FUNCTIONAL
Brand: ATTUNE

## 2022-01-10 RX ORDER — HYDROMORPHONE HCL/PF 1 MG/ML
0.5 SYRINGE (ML) INJECTION EVERY 2 HOUR PRN
Status: DISCONTINUED | OUTPATIENT
Start: 2022-01-10 | End: 2022-01-11 | Stop reason: HOSPADM

## 2022-01-10 RX ORDER — MAGNESIUM HYDROXIDE 1200 MG/15ML
LIQUID ORAL AS NEEDED
Status: DISCONTINUED | OUTPATIENT
Start: 2022-01-10 | End: 2022-01-10 | Stop reason: HOSPADM

## 2022-01-10 RX ORDER — OXYCODONE HYDROCHLORIDE 10 MG/1
10 TABLET ORAL EVERY 4 HOURS PRN
Status: DISCONTINUED | OUTPATIENT
Start: 2022-01-10 | End: 2022-01-11 | Stop reason: HOSPADM

## 2022-01-10 RX ORDER — EPHEDRINE SULFATE 50 MG/ML
INJECTION INTRAVENOUS AS NEEDED
Status: DISCONTINUED | OUTPATIENT
Start: 2022-01-10 | End: 2022-01-10

## 2022-01-10 RX ORDER — PROPOFOL 10 MG/ML
INJECTION, EMULSION INTRAVENOUS CONTINUOUS PRN
Status: DISCONTINUED | OUTPATIENT
Start: 2022-01-10 | End: 2022-01-10

## 2022-01-10 RX ORDER — SODIUM CHLORIDE, SODIUM LACTATE, POTASSIUM CHLORIDE, CALCIUM CHLORIDE 600; 310; 30; 20 MG/100ML; MG/100ML; MG/100ML; MG/100ML
INJECTION, SOLUTION INTRAVENOUS CONTINUOUS PRN
Status: DISCONTINUED | OUTPATIENT
Start: 2022-01-10 | End: 2022-01-10

## 2022-01-10 RX ORDER — OXYCODONE HYDROCHLORIDE 5 MG/1
TABLET ORAL
Qty: 30 TABLET | Refills: 0 | Status: SHIPPED | OUTPATIENT
Start: 2022-01-10 | End: 2022-01-25

## 2022-01-10 RX ORDER — ACETAMINOPHEN 325 MG/1
975 TABLET ORAL EVERY 8 HOURS
Status: DISCONTINUED | OUTPATIENT
Start: 2022-01-10 | End: 2022-01-11 | Stop reason: HOSPADM

## 2022-01-10 RX ORDER — CEFAZOLIN SODIUM 2 G/50ML
2000 SOLUTION INTRAVENOUS EVERY 8 HOURS
Status: COMPLETED | OUTPATIENT
Start: 2022-01-10 | End: 2022-01-11

## 2022-01-10 RX ORDER — LIDOCAINE HYDROCHLORIDE 10 MG/ML
INJECTION, SOLUTION EPIDURAL; INFILTRATION; INTRACAUDAL; PERINEURAL AS NEEDED
Status: DISCONTINUED | OUTPATIENT
Start: 2022-01-10 | End: 2022-01-10

## 2022-01-10 RX ORDER — BUPIVACAINE HYDROCHLORIDE 5 MG/ML
INJECTION, SOLUTION PERINEURAL
Status: COMPLETED | OUTPATIENT
Start: 2022-01-10 | End: 2022-01-10

## 2022-01-10 RX ORDER — GLYCOPYRROLATE 0.2 MG/ML
INJECTION INTRAMUSCULAR; INTRAVENOUS AS NEEDED
Status: DISCONTINUED | OUTPATIENT
Start: 2022-01-10 | End: 2022-01-10

## 2022-01-10 RX ORDER — METOCLOPRAMIDE HYDROCHLORIDE 5 MG/ML
10 INJECTION INTRAMUSCULAR; INTRAVENOUS EVERY 4 HOURS PRN
Status: DISCONTINUED | OUTPATIENT
Start: 2022-01-10 | End: 2022-01-10 | Stop reason: HOSPADM

## 2022-01-10 RX ORDER — BUPIVACAINE HYDROCHLORIDE 7.5 MG/ML
INJECTION, SOLUTION INTRASPINAL AS NEEDED
Status: DISCONTINUED | OUTPATIENT
Start: 2022-01-10 | End: 2022-01-10

## 2022-01-10 RX ORDER — ONDANSETRON 2 MG/ML
4 INJECTION INTRAMUSCULAR; INTRAVENOUS EVERY 6 HOURS PRN
Status: DISCONTINUED | OUTPATIENT
Start: 2022-01-10 | End: 2022-01-11 | Stop reason: HOSPADM

## 2022-01-10 RX ORDER — HYDRALAZINE HYDROCHLORIDE 25 MG/1
25 TABLET, FILM COATED ORAL EVERY 8 HOURS PRN
Status: DISCONTINUED | OUTPATIENT
Start: 2022-01-10 | End: 2022-01-11 | Stop reason: HOSPADM

## 2022-01-10 RX ORDER — PROPOFOL 10 MG/ML
INJECTION, EMULSION INTRAVENOUS AS NEEDED
Status: DISCONTINUED | OUTPATIENT
Start: 2022-01-10 | End: 2022-01-10

## 2022-01-10 RX ORDER — ONDANSETRON 2 MG/ML
INJECTION INTRAMUSCULAR; INTRAVENOUS AS NEEDED
Status: DISCONTINUED | OUTPATIENT
Start: 2022-01-10 | End: 2022-01-10

## 2022-01-10 RX ORDER — MAGNESIUM HYDROXIDE/ALUMINUM HYDROXICE/SIMETHICONE 120; 1200; 1200 MG/30ML; MG/30ML; MG/30ML
30 SUSPENSION ORAL EVERY 6 HOURS PRN
Status: DISCONTINUED | OUTPATIENT
Start: 2022-01-10 | End: 2022-01-11 | Stop reason: HOSPADM

## 2022-01-10 RX ORDER — DOCUSATE SODIUM 100 MG/1
100 CAPSULE, LIQUID FILLED ORAL 2 TIMES DAILY
Status: DISCONTINUED | OUTPATIENT
Start: 2022-01-10 | End: 2022-01-11 | Stop reason: HOSPADM

## 2022-01-10 RX ORDER — SODIUM CHLORIDE, SODIUM LACTATE, POTASSIUM CHLORIDE, CALCIUM CHLORIDE 600; 310; 30; 20 MG/100ML; MG/100ML; MG/100ML; MG/100ML
125 INJECTION, SOLUTION INTRAVENOUS CONTINUOUS
Status: DISCONTINUED | OUTPATIENT
Start: 2022-01-10 | End: 2022-01-11 | Stop reason: HOSPADM

## 2022-01-10 RX ORDER — ROPIVACAINE HYDROCHLORIDE 2 MG/ML
INJECTION, SOLUTION EPIDURAL; INFILTRATION; PERINEURAL
Status: COMPLETED | OUTPATIENT
Start: 2022-01-10 | End: 2022-01-10

## 2022-01-10 RX ORDER — HYDROMORPHONE HCL/PF 1 MG/ML
0.5 SYRINGE (ML) INJECTION
Status: DISCONTINUED | OUTPATIENT
Start: 2022-01-10 | End: 2022-01-10 | Stop reason: HOSPADM

## 2022-01-10 RX ORDER — OXYCODONE HYDROCHLORIDE 5 MG/1
5 TABLET ORAL EVERY 4 HOURS PRN
Status: DISCONTINUED | OUTPATIENT
Start: 2022-01-10 | End: 2022-01-11 | Stop reason: HOSPADM

## 2022-01-10 RX ORDER — METHOCARBAMOL 500 MG/1
500 TABLET, FILM COATED ORAL EVERY 6 HOURS SCHEDULED
Status: DISCONTINUED | OUTPATIENT
Start: 2022-01-10 | End: 2022-01-11 | Stop reason: HOSPADM

## 2022-01-10 RX ORDER — ONDANSETRON 2 MG/ML
4 INJECTION INTRAMUSCULAR; INTRAVENOUS EVERY 4 HOURS PRN
Status: DISCONTINUED | OUTPATIENT
Start: 2022-01-10 | End: 2022-01-10 | Stop reason: HOSPADM

## 2022-01-10 RX ORDER — SODIUM CHLORIDE, SODIUM LACTATE, POTASSIUM CHLORIDE, CALCIUM CHLORIDE 600; 310; 30; 20 MG/100ML; MG/100ML; MG/100ML; MG/100ML
1.5 INJECTION, SOLUTION INTRAVENOUS CONTINUOUS
Status: DISCONTINUED | OUTPATIENT
Start: 2022-01-10 | End: 2022-01-11 | Stop reason: HOSPADM

## 2022-01-10 RX ORDER — SENNOSIDES 8.6 MG
1 TABLET ORAL DAILY
Status: DISCONTINUED | OUTPATIENT
Start: 2022-01-10 | End: 2022-01-11 | Stop reason: HOSPADM

## 2022-01-10 RX ORDER — CHLORHEXIDINE GLUCONATE 0.12 MG/ML
15 RINSE ORAL ONCE
Status: COMPLETED | OUTPATIENT
Start: 2022-01-10 | End: 2022-01-10

## 2022-01-10 RX ORDER — FENTANYL CITRATE 50 UG/ML
INJECTION, SOLUTION INTRAMUSCULAR; INTRAVENOUS AS NEEDED
Status: DISCONTINUED | OUTPATIENT
Start: 2022-01-10 | End: 2022-01-10

## 2022-01-10 RX ORDER — CEFAZOLIN SODIUM 2 G/50ML
2000 SOLUTION INTRAVENOUS ONCE
Status: COMPLETED | OUTPATIENT
Start: 2022-01-10 | End: 2022-01-10

## 2022-01-10 RX ORDER — FENTANYL CITRATE/PF 50 MCG/ML
50 SYRINGE (ML) INJECTION
Status: DISCONTINUED | OUTPATIENT
Start: 2022-01-10 | End: 2022-01-10 | Stop reason: HOSPADM

## 2022-01-10 RX ORDER — MIDAZOLAM HYDROCHLORIDE 2 MG/2ML
INJECTION, SOLUTION INTRAMUSCULAR; INTRAVENOUS AS NEEDED
Status: DISCONTINUED | OUTPATIENT
Start: 2022-01-10 | End: 2022-01-10

## 2022-01-10 RX ADMIN — BUPIVACAINE HYDROCHLORIDE 5 ML: 5 INJECTION, SOLUTION PERINEURAL at 12:34

## 2022-01-10 RX ADMIN — BUPIVACAINE HYDROCHLORIDE IN DEXTROSE 1.8 ML: 7.5 INJECTION, SOLUTION SUBARACHNOID at 12:48

## 2022-01-10 RX ADMIN — SODIUM CHLORIDE, SODIUM LACTATE, POTASSIUM CHLORIDE, AND CALCIUM CHLORIDE 1.5 ML/KG/HR: .6; .31; .03; .02 INJECTION, SOLUTION INTRAVENOUS at 23:38

## 2022-01-10 RX ADMIN — CEFAZOLIN SODIUM 2000 MG: 2 SOLUTION INTRAVENOUS at 21:21

## 2022-01-10 RX ADMIN — OXYCODONE HYDROCHLORIDE 10 MG: 10 TABLET ORAL at 23:34

## 2022-01-10 RX ADMIN — METHOCARBAMOL 500 MG: 500 TABLET, FILM COATED ORAL at 17:04

## 2022-01-10 RX ADMIN — GLYCOPYRROLATE 0.2 MG: 0.2 INJECTION, SOLUTION INTRAMUSCULAR; INTRAVENOUS at 13:10

## 2022-01-10 RX ADMIN — TRANEXAMIC ACID 1000 MG: 100 INJECTION, SOLUTION INTRAVENOUS at 13:01

## 2022-01-10 RX ADMIN — PROPOFOL 100 MCG/KG/MIN: 10 INJECTION, EMULSION INTRAVENOUS at 12:50

## 2022-01-10 RX ADMIN — OXYCODONE HYDROCHLORIDE 10 MG: 10 TABLET ORAL at 17:05

## 2022-01-10 RX ADMIN — SODIUM CHLORIDE, SODIUM LACTATE, POTASSIUM CHLORIDE, AND CALCIUM CHLORIDE 1.5 ML/KG/HR: .6; .31; .03; .02 INJECTION, SOLUTION INTRAVENOUS at 17:06

## 2022-01-10 RX ADMIN — SODIUM CHLORIDE, SODIUM LACTATE, POTASSIUM CHLORIDE, AND CALCIUM CHLORIDE: .6; .31; .03; .02 INJECTION, SOLUTION INTRAVENOUS at 12:27

## 2022-01-10 RX ADMIN — MIDAZOLAM 2 MG: 1 INJECTION INTRAMUSCULAR; INTRAVENOUS at 12:32

## 2022-01-10 RX ADMIN — CEFAZOLIN SODIUM 2000 MG: 2 SOLUTION INTRAVENOUS at 12:40

## 2022-01-10 RX ADMIN — PROPOFOL 60 MG: 10 INJECTION, EMULSION INTRAVENOUS at 12:50

## 2022-01-10 RX ADMIN — CHLORHEXIDINE GLUCONATE 15 ML: 1.2 SOLUTION ORAL at 11:20

## 2022-01-10 RX ADMIN — FENTANYL CITRATE 50 MCG: 50 INJECTION INTRAMUSCULAR; INTRAVENOUS at 12:34

## 2022-01-10 RX ADMIN — EPHEDRINE SULFATE 5 MG: 50 INJECTION, SOLUTION INTRAVENOUS at 14:02

## 2022-01-10 RX ADMIN — LIDOCAINE HYDROCHLORIDE 3 ML: 10 INJECTION, SOLUTION EPIDURAL; INFILTRATION; INTRACAUDAL; PERINEURAL at 12:47

## 2022-01-10 RX ADMIN — FENTANYL CITRATE 50 MCG: 50 INJECTION INTRAMUSCULAR; INTRAVENOUS at 12:32

## 2022-01-10 RX ADMIN — DOCUSATE SODIUM 100 MG: 100 CAPSULE ORAL at 17:04

## 2022-01-10 RX ADMIN — PHENYLEPHRINE HYDROCHLORIDE 20 MCG/MIN: 10 INJECTION INTRAVENOUS at 13:10

## 2022-01-10 RX ADMIN — SODIUM CHLORIDE, SODIUM LACTATE, POTASSIUM CHLORIDE, AND CALCIUM CHLORIDE: .6; .31; .03; .02 INJECTION, SOLUTION INTRAVENOUS at 13:30

## 2022-01-10 RX ADMIN — EPHEDRINE SULFATE 5 MG: 50 INJECTION, SOLUTION INTRAVENOUS at 13:56

## 2022-01-10 RX ADMIN — ONDANSETRON 4 MG: 2 INJECTION INTRAMUSCULAR; INTRAVENOUS at 13:45

## 2022-01-10 RX ADMIN — ACETAMINOPHEN 975 MG: 325 TABLET, FILM COATED ORAL at 17:04

## 2022-01-10 RX ADMIN — ACETAMINOPHEN 975 MG: 325 TABLET, FILM COATED ORAL at 23:35

## 2022-01-10 RX ADMIN — PHENYLEPHRINE HYDROCHLORIDE 30 MCG/MIN: 10 INJECTION INTRAVENOUS at 12:50

## 2022-01-10 RX ADMIN — METHOCARBAMOL 500 MG: 500 TABLET, FILM COATED ORAL at 23:35

## 2022-01-10 RX ADMIN — ROPIVACAINE HYDROCHLORIDE 20 ML: 2 INJECTION, SOLUTION EPIDURAL; INFILTRATION at 12:37

## 2022-01-10 NOTE — ANESTHESIA PROCEDURE NOTES
Spinal Block    Patient location during procedure: OR  Start time: 1/10/2022 12:48 PM  Reason for block: at surgeon's request and primary anesthetic  Staffing  Performed: CRNA   Anesthesiologist: Melia Kruse MD  Resident/CRNA: Kaela Lubin CRNA  Preanesthetic Checklist  Completed: patient identified, IV checked, risks and benefits discussed, surgical consent, monitors and equipment checked, pre-op evaluation and timeout performed  Spinal Block  Patient position: sitting  Prep: ChloraPrep  Patient monitoring: cardiac monitor, frequent blood pressure checks and continuous pulse ox  Approach: midline  Location: L3-4  Injection technique: single-shot  Needle  Needle type: pencil-tip   Needle gauge: 25 G  Needle length: 10 cm  Assessment  Sensory level: T4  Injection Assessment:  negative aspiration for heme, no paresthesia on injection and positive aspiration for clear CSF    Post-procedure:  adhesive bandage applied, pressure dressing applied, secured with tape, site cleaned and sterile dressing applied

## 2022-01-10 NOTE — PLAN OF CARE
Problem: MOBILITY - ADULT  Goal: Maintain or return to baseline ADL function  Description: INTERVENTIONS:  -  Assess patient's ability to carry out ADLs; assess patient's baseline for ADL function and identify physical deficits which impact ability to perform ADLs (bathing, care of mouth/teeth, toileting, grooming, dressing, etc )  - Assess/evaluate cause of self-care deficits   - Assess range of motion  - Assess patient's mobility; develop plan if impaired  - Assess patient's need for assistive devices and provide as appropriate  - Encourage maximum independence but intervene and supervise when necessary  - Involve family in performance of ADLs  - Assess for home care needs following discharge   - Consider OT consult to assist with ADL evaluation and planning for discharge  - Provide patient education as appropriate  Outcome: Progressing  Goal: Maintains/Returns to pre admission functional level  Description: INTERVENTIONS:  - Perform BMAT or MOVE assessment daily    - Set and communicate daily mobility goal to care team and patient/family/caregiver  - Collaborate with rehabilitation services on mobility goals if consulted  - Perform Range of Motion  times a day  - Reposition patient every  hours    - Dangle patient  times a day  - Stand patient  times a day  - Ambulate patient  times a day  - Out of bed to chair  times a day   - Out of bed for meals  times a day  - Out of bed for toileting  - Record patient progress and toleration of activity level   Outcome: Progressing     Problem: PAIN - ADULT  Goal: Verbalizes/displays adequate comfort level or baseline comfort level  Description: Interventions:  - Encourage patient to monitor pain and request assistance  - Assess pain using appropriate pain scale  - Administer analgesics based on type and severity of pain and evaluate response  - Implement non-pharmacological measures as appropriate and evaluate response  - Consider cultural and social influences on pain and pain management  - Notify physician/advanced practitioner if interventions unsuccessful or patient reports new pain  Outcome: Progressing     Problem: INFECTION - ADULT  Goal: Absence or prevention of progression during hospitalization  Description: INTERVENTIONS:  - Assess and monitor for signs and symptoms of infection  - Monitor lab/diagnostic results  - Monitor all insertion sites, i e  indwelling lines, tubes, and drains  - Monitor endotracheal if appropriate and nasal secretions for changes in amount and color  - [place] appropriate cooling/warming therapies per order  - Administer medications as ordered  - Instruct and encourage patient and family to use good hand hygiene technique  - Identify and instruct in appropriate isolation precautions for identified infection/condition  Outcome: Progressing  Goal: Absence of fever/infection during neutropenic period  Description: INTERVENTIONS:  - Monitor WBC    Outcome: Progressing     Problem: SAFETY ADULT  Goal: Maintain or return to baseline ADL function  Description: INTERVENTIONS:  -  Assess patient's ability to carry out ADLs; assess patient's baseline for ADL function and identify physical deficits which impact ability to perform ADLs (bathing, care of mouth/teeth, toileting, grooming, dressing, etc )  - Assess/evaluate cause of self-care deficits   - Assess range of motion  - Assess patient's mobility; develop plan if impaired  - Assess patient's need for assistive devices and provide as appropriate  - Encourage maximum independence but intervene and supervise when necessary  - Involve family in performance of ADLs  - Assess for home care needs following discharge   - Consider OT consult to assist with ADL evaluation and planning for discharge  - Provide patient education as appropriate  Outcome: Progressing  Goal: Maintains/Returns to pre admission functional level  Description: INTERVENTIONS:  - Perform BMAT or MOVE assessment daily    - Set and communicate daily mobility goal to care team and patient/family/caregiver  - Collaborate with rehabilitation services on mobility goals if consulted  - Perform Range of Motion  times a day  - Reposition patient every  hours    - Dangle patient  times a day  - Stand patient  times a day  - Ambulate patient  times a day  - Out of bed to chair  times a day   - Out of bed for meals  times a day  - Out of bed for toileting  - Record patient progress and toleration of activity level   Outcome: Progressing  Goal: Patient will remain free of falls  Description: INTERVENTIONS:  - Educate patient/family on patient safety including physical limitations  - Instruct patient to call for assistance with activity   - Consult OT/PT to assist with strengthening/mobility   - Keep Call bell within reach  - Keep bed low and locked with side rails adjusted as appropriate  - Keep care items and personal belongings within reach  - Initiate and maintain comfort rounds  - Make Fall Risk Sign visible to staff  - Offer Toileting every  Hours, in advance of need  - Initiate/Maintain alarm  - Obtain necessary fall risk management equipment  - Apply yellow socks and bracelet for high fall risk patients  - Consider moving patient to room near nurses station  Outcome: Progressing     Problem: DISCHARGE PLANNING  Goal: Discharge to home or other facility with appropriate resources  Description: INTERVENTIONS:  - Identify barriers to discharge w/patient and caregiver  - Arrange for needed discharge resources and transportation as appropriate  - Identify discharge learning needs (meds, wound care, etc )  - Arrange for interpretive services to assist at discharge as needed  - Refer to Case Management Department for coordinating discharge planning if the patient needs post-hospital services based on physician/advanced practitioner order or complex needs related to functional status, cognitive ability, or social support system  Outcome: Progressing Problem: Knowledge Deficit  Goal: Patient/family/caregiver demonstrates understanding of disease process, treatment plan, medications, and discharge instructions  Description: Complete learning assessment and assess knowledge base    Interventions:  - Provide teaching at level of understanding  - Provide teaching via preferred learning methods  Outcome: Progressing

## 2022-01-10 NOTE — PHYSICAL THERAPY NOTE
Physical Therapy Evaluation     Patient's Name: Jorge Luis Samano    Admitting Diagnosis  Primary osteoarthritis of right knee [M17 11]    Problem List  Patient Active Problem List   Diagnosis    Primary osteoarthritis of right knee    Primary osteoarthritis of left knee    Chronic pain of right knee    Aftercare following right knee joint replacement surgery    BMI 26 0-26 9,adult    H/O splenectomy    Hypertension, essential       Past Medical History  Past Medical History:   Diagnosis Date    Fractures     History of transfusion     Hypertension        Past Surgical History  Past Surgical History:   Procedure Laterality Date    ABDOMINAL SURGERY      KNEE ARTHROSCOPY      KNEE SURGERY      ROTATOR CUFF REPAIR      SHOULDER SURGERY      SPLENECTOMY            01/10/22 1420   PT Last Visit   PT Visit Date 01/10/22   Note Type   Note type Evaluation   Pain Assessment   Pain Assessment Tool 0-10   Pain Score 2   Pain Location/Orientation Orientation: Right;Location: Knee   Restrictions/Precautions   Weight Bearing Precautions Per Order Yes   RLE Weight Bearing Per Order WBAT   Other Precautions Multiple lines; Fall Risk;Pain;WBS   Home Living   Type of [address] [place] [address] Two level; Able to live on main level with bedroom/bathroom  (1STE, full bath on first floor)   Bathroom Shower/Tub Walk-in shower   Bathroom Toilet Raised   Bathroom Equipment Built-in shower seat;Commode  (BSC over toilet)   Home Equipment Walker;Cane  (denies use PTA)   Prior Function   Level of [place] Independent with ADLs and functional mobility   Lives With Spouse   ADL Assistance Independent   IADLs Independent   Falls in the last 6 months 0   Vocational Retired   Comments Pt reports spouse will be home and able to assist as needed     General   Family/Caregiver Present Yes  (wife)   Cognition   Overall Cognitive Status WFL   Arousal/Participation Alert   Orientation Level Oriented X4   Memory Within functional limits Following Commands Follows all commands and directions without difficulty   Subjective   Subjective Pt pleasant and agreeable to participate in therapy session  RLE Assessment   RLE Assessment   (functionally 3-/5, limited due to pain)   LLE Assessment   LLE Assessment WFL   Coordination   Sensation   (reports numbness R LE)   Light Touch   RLE Light Touch Grossly intact   LLE Light Touch Grossly intact   Bed Mobility   Supine to Sit 5  Supervision   Additional items Verbal cues;HOB elevated   Additional Comments Supine in bed upon PT arrival   Pt left upright in bedside chair with all needs in reach  Transfers   Sit to Stand 4  Minimal assistance   Additional items Assist x 1; Increased time required; Impulsive;Verbal cues   Stand to Sit 4  Minimal assistance   Additional items Assist x 1; Increased time required;Verbal cues; Impulsive   Additional Comments Transfers with RW   VC for hand placement and safety  Ambulation/Elevation   Gait pattern Excessively slow; Short stride;Decreased foot clearance;Decreased R stance; Improper Weight shift   Gait Assistance 4  Minimal assist   Additional items Assist x 1;Verbal cues; Tactile cues   Assistive Device Rolling walker   Distance 3 ft from bed to chair   Balance   Static Sitting Good   Dynamic Sitting Fair +   Static Standing Fair   Dynamic Standing Fair   Ambulatory Fair -   Endurance Deficit   Endurance Deficit Yes   Endurance Deficit Description pain, weakness   Activity Tolerance   Activity Tolerance Patient limited by fatigue;Patient limited by pain   Nurse Made Aware RN cleared pt to be seen by PT   Assessment   Prognosis Good   Problem List Decreased strength;Decreased range of motion;Decreased endurance; Impaired balance;Decreased mobility;Orthopedic restrictions;Pain   Assessment Pt seen for high complexity PT evaluation due to decrease in functional mobility status compared to baseline  Pt with active PT eval/treat and POD#0 orders at this time    Pt is a 57 y o  M who presented to [facility] for elective R TKA on 1/10/21  Pt  has a past medical history of Fractures, History of transfusion, and Hypertension  Pt resides with spouse in [facility] with 1STE  Pt presents with decreased strength, balance, endurance that contribute to limitations in bed mobility, functional transfers, functional mobiltiy  Pt requires Min A for all mobility at this time  Pt left upright in bedside chair with all needs in reach  Pt will benefit from skilled therapy in order to address current impairments and functional limitations  PT to follow pt and recommending OPPT pending progress  The patient's AM-PAC Basic Mobility Inpatient Short Form Raw Score is 18  A Raw score of greater than 16 suggests the patient may benefit from discharge to home  Please also refer to the recommendation of the Physical Therapist for safe discharge planning  Barriers to Discharge Inaccessible home environment   Goals   Patient Goals to get better   STG Expiration Date 01/24/22   Short Term Goal #1 1  Pt will demonstrate ability to perform all aspects of bed mobility with I in order to increase independence and decrease burden on caregivers  2  Pt will demonstrate ability to perform functional transfers with Mod I in order to increase independence and decrease burden on caregivers  3  Pt will demonstrate ability to ambulate 200 ft with least restrictive AD with Mod I in order to return to mobility safely  4  Pt will demonstrate ability to negotiate full flight steps with/without HR and Mod I in order to return to household/community mobility safely  5  Pt will demonstrate improved balance by one grade order to decrease risk of falls  6  Pt will increase b/l LE strength by 1 grade in order to increase ease of functional mobility and transfers  Plan   Treatment/Interventions Functional transfer training;LE strengthening/ROM; Therapeutic exercise;Elevations; Endurance training;Patient/family training;Equipment eval/education; Bed mobility;Gait training;Spoke to nursing   PT Frequency Twice a day   Recommendation   PT Discharge Recommendation Home with outpatient rehabilitation   Equipment Recommended [facility] Recommended Wheeled walker   Change/add to [place] [employer]? No   Additional Comments NOT cleared for DC - pending progress with PT   AM-PAC Basic Mobility Inpatient   Turning in Bed Without Bedrails 3   Lying on Back to Sitting on Edge of Flat Bed 3   Moving Bed to Chair 3   Standing Up From Chair 3   Walk in Room 3   Climb 3-5 Stairs 3   Basic Mobility Inpatient Raw Score 18   Basic Mobility Standardized Score 41 05   Highest Level Of Mobility   -HL Goal 6: Walk 10 steps or more   JH-HLM Highest Level of Mobility 4: Move to chair/commode   JH-HLM Goal Achieved No   Modified [place] Scale   Modified [place] Scale 4   End of Consult   Patient Position at End of Consult Bedside chair; All needs within reach         Orie Gowers, PT, DPT

## 2022-01-10 NOTE — H&P (VIEW-ONLY)
Office Visit    9/16/2021  Mega Jaime MD      Orthopedic Surgery  Primary osteoarthritis of right knee +3 more      Dx  Right Knee - Pain  Left Knee - Pain; Referred by Edelmira Khoury      Reason for Visit       Progress Notes  Paris Mcgrath MD (Resident) [facility]  Cosigned by: Hawk Bear MD at 9/16/2021  4:48 PM   Attestation signed by Hawk Bear MD at 9/16/2021  4:48 PM    Patient was seen and examined today  Case was reviewed with the resident physician  Agree with the history, exam, assessment, plan as documented by the resident physician   Adult male active by nature presents for evaluation  He has persistent weight-bearing pain in both knees right far greater than left  His pain level both knee joints, the pain is made worse bearing weight, the pain greatly interferes with this pursue to the activities daily living  He has had treatments including injections of cortisone in the past, all without relief of his car bilaterally symptoms  Again the right FX of far more profoundly than the left   Examination finds both knees have intact soft tissue envelope  Right knee is small effusion left as none  Right knee is in valgus left knee is in varus  Right knee is intact extensor mechanism can flex well  There is bony enlargement tenderness laterally  There is crepitation flexion extension  There is no palpable warmth the synovium  Both the right knee has a mobile right hip, distal the right knee has a mobile right ankle  Left knee is in varus  There is bony enlargement tenderness medially  There is crepitation flexion extension  There is no tenderness in the calf      I personally reviewed standing x-rays of both knees my interpretation is as follows:    Lateral and patellofemoral compartment arthritis was seen in the right knee, medial and patellofemoral compartment arthritis seen in the left knee   Assessment:  58-year-old male pain and dysfunction in both knees right far greater than left stemming from osteoarthritis  All diagnoses were discussed the patient  Treatment options including risks, benefits, alternatives discussed in detail  I think at this point time right knee replacement is indicated  Consent was established for this above-mentioned surgery  No guarantees were given  Office follow-up as a postoperative patient is warranted         Assessment  Problem List Items Addressed This Visit                 Musculoskeletal and Integument      Primary osteoarthritis of right knee - Primary      Relevant Medications      ascorbic acid (VITAMIN C) 500 MG tablet      ferrous sulfate 324 (65 Fe) mg      folic acid (FOLVITE) 1 mg tablet      Other Relevant Orders      Comprehensive metabolic panel      Hemoglobin A1C W/EAG Estimation      CBC and differential      Anemia Panel w/Reflex      Protime-INR      APTT      Type and screen      Ambulatory referral to Family Practice      Ambulatory referral to Physical Therapy      Case request operating room: ARTHROPLASTY KNEE TOTAL (Completed)      EKG 12 lead      PAT Covid Screening      Primary osteoarthritis of left knee                Other Visit Diagnoses      Chronic pain of right knee         Aftercare following right knee joint replacement surgery         Relevant Medications     enoxaparin (LOVENOX) 40 mg/0 4 mL             Discussion and Plan:       Presents for evaluation of bilateral knee pain secondary to known osteoarthritis  Patient has failed conservative treatment measures such as corticosteroid injections at-home physical therapy without any significant pain relief  His right knee is worse than his left and overall his knee pain has significantly influenced his ability to perform his daily functions    After detailed discussion with the patient, the  Risk and benefits of undergoing elective right total knee arthroplasty were discussed  Patient wished to proceed with elective knee replacement and informed consent was obtained  No guarantees were given  Patient was prescribed folic acid, vitamin-C, and iron to be taken 30 days prior to his date of surgery  Additionally was prescribed a 28 day course of Lovenox to be started postoperatively for DVT prophylaxis  He be scheduled for 1 week follow-up following his date of surgery      Subjective:   Patient ID: Esau Rhoades is a 57 y o  male        Presents for evaluation of bilateral knee pain secondary to known osteoarthritis  His right knee is worse than his left but both are chronic in nature  His knee pain is exacerbated by activity relieved by rest   Specifically knee flexion activities such as ascending and descending stairs, squatting and exercising at his pain  He has received corticosteroid injections in at-home physical therapy without any relief of his knee pain  At is debilitating at times and significantly influences ability to perform his daily functions  With regards to his right knee, his pain is laterally based and with rest his left knee it is medially based  He has a remote history of a left knee arthroscopy with meniscal repair               The following portions of the patient's history were reviewed and updated as appropriate: allergies, current medications, past family history, past medical history, past social history, past surgical history and problem list      Review of Systems   Constitutional: Negative for fever  HENT: Negative for congestion  Eyes: Negative for photophobia  Respiratory: Negative for shortness of breath  Cardiovascular: Negative for chest pain  Gastrointestinal: Negative for nausea and vomiting  Musculoskeletal: Positive for arthralgias  Skin: Negative for rash  Allergic/Immunologic: Negative for immunocompromised state  Neurological: Negative for headaches     Psychiatric/Behavioral: Negative for behavioral problems          Objective:  /97   Pulse 62   Ht 6' (1 829 m)   Wt 87 5 kg (193 lb)   BMI 26 18 kg/m²         Right Ankle Exam      Comments:  Bilateral   Skin intact   No effusion or erythema   Valgus alignment   Right  Varus alignment left  Left Medial joint line and Right Lateral joint line TTP   Normal strength   Good range of motion  Calf compartment soft and supple  Sensation intact  Toes are warm well perfused                    Physical Exam  Vitals reviewed  HENT:      Head: Normocephalic  Right Ear: External ear normal       Left Ear: External ear normal       Nose: Nose normal       Mouth/Throat:      Pharynx: Oropharynx is clear  Eyes:      Pupils: Pupils are equal, round, and reactive to light  Cardiovascular:      Rate and Rhythm: Normal rate  Pulses: Normal pulses  Pulmonary:      Effort: Pulmonary effort is normal    Abdominal:      Palpations: Abdomen is soft  Musculoskeletal:      Cervical back: Normal range of motion  Comments: Please see ortho exam    Skin:     Capillary Refill: Capillary refill takes less than 2 seconds  Neurological:      Mental Status: He is alert  Mental status is at baseline  Psychiatric:         Mood and Affect: Mood normal                I have personally reviewed pertinent films in PACS and my interpretation is as follows       bilateral standing x-rays of the knees showing lateral joint space narrowing with bone-on-bone contact on the right and medial functions narrowing on the left              Instructions         Return in about 1 week (around 9/23/2021)       After Visit Summary (Automatic SnapShot taken 9/16/2021)        Additional Documentation    Vitals:  /97     Pulse 62     Ht 6' (1 829 m)     Wt 87 5 kg (193 lb)     BMI 26 18 kg/m²     BSA 2 1 m²            More Vitals     SmartForms:   JESSICA PRE-CHARTING [facility] WRAP UP REQUIREMENTS ADVANCED        Encounter Info:  Billing Info,     History,     Allergies,     Detailed Report          Communications         Summary of Care - for Follow Up Providers Only sent to Tolu Mancilla MD   Sent 10/4/2021 by Jorge Kilpatrick MD         Summary of Care - for Follow Up Providers Only sent to Tolu Mancilla MD   Sent 10/4/2021 by Jorge Kilpatrick MD          Orders Placed         Labs         Comprehensive metabolic panel         Anemia Panel w/Reflex         APTT         CBC and differential         Protime-INR         Type and screen         Hemoglobin A1C W/EAG Estimation         PAT Covid Screening       Nikkie Rico  (6 more)       Other Orders         Ambulatory referral to Family Practice Pending Review         Ambulatory referral to Physical Therapy Pending Review         Case request operating room: ARTHROPLASTY KNEE TOTAL Once         EKG 12 lead          All Encounter Results     Medication Changes         Ascorbic Acid 500 mg Oral 2 times daily         Enoxaparin Sodium 40 mg Subcutaneous Daily, To start post op         Ferrous Sulfate 324 mg Oral Daily before breakfast         Folic Acid 1 mg Oral Daily       Medication List     Visit Diagnoses         Primary osteoarthritis of right knee         Chronic pain of right knee         Primary osteoarthritis of left knee         Aftercare following right knee joint replacement surgery       Problem List

## 2022-01-10 NOTE — CONSULTS
Office Visit    9/16/2021  Mega Jaime MD      Orthopedic Surgery  Primary osteoarthritis of right knee +3 more      Dx  Right Knee - Pain  Left Knee - Pain; Referred by Halina Apley      Reason for Visit       Progress Notes  Osorio Dawn MD (Resident) [facility]  Cosigned by: Isacc Stuart MD at 9/16/2021  4:48 PM   Attestation signed by Isacc Stuart MD at 9/16/2021  4:48 PM    Patient was seen and examined today  Case was reviewed with the resident physician  Agree with the history, exam, assessment, plan as documented by the resident physician   Adult male active by nature presents for evaluation  He has persistent weight-bearing pain in both knees right far greater than left  His pain level both knee joints, the pain is made worse bearing weight, the pain greatly interferes with this pursue to the activities daily living  He has had treatments including injections of cortisone in the past, all without relief of his car bilaterally symptoms  Again the right FX of far more profoundly than the left   Examination finds both knees have intact soft tissue envelope  Right knee is small effusion left as none  Right knee is in valgus left knee is in varus  Right knee is intact extensor mechanism can flex well  There is bony enlargement tenderness laterally  There is crepitation flexion extension  There is no palpable warmth the synovium  Both the right knee has a mobile right hip, distal the right knee has a mobile right ankle  Left knee is in varus  There is bony enlargement tenderness medially  There is crepitation flexion extension  There is no tenderness in the calf      I personally reviewed standing x-rays of both knees my interpretation is as follows:    Lateral and patellofemoral compartment arthritis was seen in the right knee, medial and patellofemoral compartment arthritis seen in the left knee   Assessment:  18-year-old male pain and dysfunction in both knees right far greater than left stemming from osteoarthritis  All diagnoses were discussed the patient  Treatment options including risks, benefits, alternatives discussed in detail  I think at this point time right knee replacement is indicated  Consent was established for this above-mentioned surgery  No guarantees were given  Office follow-up as a postoperative patient is warranted         Assessment  Problem List Items Addressed This Visit                 Musculoskeletal and Integument      Primary osteoarthritis of right knee - Primary      Relevant Medications      ascorbic acid (VITAMIN C) 500 MG tablet      ferrous sulfate 324 (65 Fe) mg      folic acid (FOLVITE) 1 mg tablet      Other Relevant Orders      Comprehensive metabolic panel      Hemoglobin A1C W/EAG Estimation      CBC and differential      Anemia Panel w/Reflex      Protime-INR      APTT      Type and screen      Ambulatory referral to Family Practice      Ambulatory referral to Physical Therapy      Case request operating room: ARTHROPLASTY KNEE TOTAL (Completed)      EKG 12 lead      PAT Covid Screening      Primary osteoarthritis of left knee                Other Visit Diagnoses      Chronic pain of right knee         Aftercare following right knee joint replacement surgery         Relevant Medications     enoxaparin (LOVENOX) 40 mg/0 4 mL             Discussion and Plan:       Presents for evaluation of bilateral knee pain secondary to known osteoarthritis  Patient has failed conservative treatment measures such as corticosteroid injections at-home physical therapy without any significant pain relief  His right knee is worse than his left and overall his knee pain has significantly influenced his ability to perform his daily functions    After detailed discussion with the patient, the  Risk and benefits of undergoing elective right total knee arthroplasty were discussed  Patient wished to proceed with elective knee replacement and informed consent was obtained  No guarantees were given  Patient was prescribed folic acid, vitamin-C, and iron to be taken 30 days prior to his date of surgery  Additionally was prescribed a 28 day course of Lovenox to be started postoperatively for DVT prophylaxis  He be scheduled for 1 week follow-up following his date of surgery      Subjective:   Patient ID: Sharon Ham is a 57 y o  male        Presents for evaluation of bilateral knee pain secondary to known osteoarthritis  His right knee is worse than his left but both are chronic in nature  His knee pain is exacerbated by activity relieved by rest   Specifically knee flexion activities such as ascending and descending stairs, squatting and exercising at his pain  He has received corticosteroid injections in at-home physical therapy without any relief of his knee pain  At is debilitating at times and significantly influences ability to perform his daily functions  With regards to his right knee, his pain is laterally based and with rest his left knee it is medially based  He has a remote history of a left knee arthroscopy with meniscal repair               The following portions of the patient's history were reviewed and updated as appropriate: allergies, current medications, past family history, past medical history, past social history, past surgical history and problem list      Review of Systems   Constitutional: Negative for fever  HENT: Negative for congestion  Eyes: Negative for photophobia  Respiratory: Negative for shortness of breath  Cardiovascular: Negative for chest pain  Gastrointestinal: Negative for nausea and vomiting  Musculoskeletal: Positive for arthralgias  Skin: Negative for rash  Allergic/Immunologic: Negative for immunocompromised state  Neurological: Negative for headaches     Psychiatric/Behavioral: Negative for behavioral problems          Objective:  /97   Pulse 62   Ht 6' (1 829 m)   Wt 87 5 kg (193 lb)   BMI 26 18 kg/m²         Right Ankle Exam      Comments:  Bilateral   Skin intact   No effusion or erythema   Valgus alignment   Right  Varus alignment left  Left Medial joint line and Right Lateral joint line TTP   Normal strength   Good range of motion  Calf compartment soft and supple  Sensation intact  Toes are warm well perfused                    Physical Exam  Vitals reviewed  HENT:      Head: Normocephalic  Right Ear: External ear normal       Left Ear: External ear normal       Nose: Nose normal       Mouth/Throat:      Pharynx: Oropharynx is clear  Eyes:      Pupils: Pupils are equal, round, and reactive to light  Cardiovascular:      Rate and Rhythm: Normal rate  Pulses: Normal pulses  Pulmonary:      Effort: Pulmonary effort is normal    Abdominal:      Palpations: Abdomen is soft  Musculoskeletal:      Cervical back: Normal range of motion  Comments: Please see ortho exam    Skin:     Capillary Refill: Capillary refill takes less than 2 seconds  Neurological:      Mental Status: He is alert  Mental status is at baseline  Psychiatric:         Mood and Affect: Mood normal                I have personally reviewed pertinent films in PACS and my interpretation is as follows       bilateral standing x-rays of the knees showing lateral joint space narrowing with bone-on-bone contact on the right and medial functions narrowing on the left              Instructions         Return in about 1 week (around 9/23/2021)       After Visit Summary (Automatic SnapShot taken 9/16/2021)        Additional Documentation    Vitals:  /97     Pulse 62     Ht 6' (1 829 m)     Wt 87 5 kg (193 lb)     BMI 26 18 kg/m²     BSA 2 1 m²            More Vitals     SmartForms:   JESSICA PRE-CHARTING [facility] WRAP UP REQUIREMENTS ADVANCED        Encounter Info:  Billing Info,     History,     Allergies,     Detailed Report          Communications         Summary of Care - for Follow Up Providers Only sent to Neida Oliveira MD   Sent 10/4/2021 by Nadeem Villalba MD         Summary of Care - for Follow Up Providers Only sent to Neida Oliveira MD   Sent 10/4/2021 by Nadeem Villalba MD          Orders Placed         Labs         Comprehensive metabolic panel         Anemia Panel w/Reflex         APTT         CBC and differential         Protime-INR         Type and screen         Hemoglobin A1C W/EAG Estimation         PAT Covid Screening       [place] Organ  (6 more)       Other Orders         Ambulatory referral to Family Practice Pending Review         Ambulatory referral to Physical Therapy Pending Review         Case request operating room: ARTHROPLASTY KNEE TOTAL Once         EKG 12 lead          All Encounter Results     Medication Changes         Ascorbic Acid 500 mg Oral 2 times daily         Enoxaparin Sodium 40 mg Subcutaneous Daily, To start post op         Ferrous Sulfate 324 mg Oral Daily before breakfast         Folic Acid 1 mg Oral Daily       Medication List     Visit Diagnoses         Primary osteoarthritis of right knee         Chronic pain of right knee         Primary osteoarthritis of left knee         Aftercare following right knee joint replacement surgery       Problem List

## 2022-01-10 NOTE — ANESTHESIA PROCEDURE NOTES
Peripheral Block    Patient location during procedure: holding area  Start time: 1/10/2022 12:34 PM  Reason for block: at surgeon's request and post-op pain management  Staffing  Anesthesiologist: Aliyah Kang MD  Preanesthetic Checklist  Completed: patient identified, IV checked, site marked, risks and benefits discussed, surgical consent, monitors and equipment checked, pre-op evaluation and timeout performed  Peripheral Block  Patient position: supine  Prep: ChloraPrep  Patient monitoring: continuous pulse ox and frequent blood pressure checks  Block type: adductor canal block  Laterality: right  Injection technique: single-shot  Procedures: ultrasound guided, Ultrasound guidance required for the procedure to increase accuracy and safety of medication placement and decrease risk of complications  Ultrasound permanent image savedbupivacaine (MARCAINE) 0 5 % perineural infiltration, 5 mL  Needle  Needle type: Stimuplex   Needle gauge: 20g  Needle length: 4in    Needle localization: ultrasound guidance  Test dose: negative  Assessment  Injection assessment: incremental injection, local visualized surrounding nerve on ultrasound, no paresthesia on injection and negative aspiration for heme  Paresthesia pain: none  Heart rate change: no  Slow fractionated injection: yes  Post-procedure:  site cleaned  patient tolerated the procedure well with no immediate complications

## 2022-01-10 NOTE — ANESTHESIA PREPROCEDURE EVALUATION
Procedure:  ARTHROPLASTY KNEE TOTAL W ROBOT (Right Knee)    Relevant Problems   CARDIO   (+) Hypertension, essential      MUSCULOSKELETAL   (+) Primary osteoarthritis of left knee   (+) Primary osteoarthritis of right knee      Lab Results   Component Value Date    WBC 7 80 12/13/2021    HGB 14 8 12/13/2021    HCT 44 9 12/13/2021    MCV 95 12/13/2021     12/13/2021     Lab Results   Component Value Date    K 5 3 12/13/2021    CO2 31 12/13/2021     12/13/2021    BUN 17 12/13/2021    CREATININE 0 82 12/13/2021     Lab Results   Component Value Date    INR 1 00 12/13/2021    PROTIME 12 8 12/13/2021     Lab Results   Component Value Date    PTT 28 12/13/2021       No results found for: GLUCOSE    Lab Results   Component Value Date    HGBA1C 5 4 12/13/2021       Type and Screen:  A     Physical Exam    Airway    Mallampati score: I  TM Distance: >3 FB  Neck ROM: full     Dental       Cardiovascular      Pulmonary      Other Findings        Anesthesia Plan  ASA Score- 2     Anesthesia Type- IV sedation with anesthesia, regional and spinal with ASA Monitors  Additional Monitors:   Airway Plan:     Comment: Discussed plan for long acting adductor canal and iPACK nerve blocks for postoperative analgesia discussed with risks/benefits/alternatives  Patient understands that these blocks are intended to be motor sparing, allowing for immediate postoperative physical therapy  Consequentially, this combination of blocks is not expected to provide complete analgesia to the joint and is primarily intended to reduce postoperative opioid consumption  Discussed plan for spinal anesthetic with risks/benefits/alternatives, including extremely low risk of spinal hematoma, infection, peripheral nerve damage, and paralysis  Patient aware of benefits including improved postoperative analgesia, decreased intraoperative bleeding, decreased intraoperative DVT risk, and avoidance of general anesthetic   Discussed possibility of general anesthesia as a back-up to neuraxial anesthetic given frequent concomitant degenerative disease of the lumbar spine    Plan Factors-Exercise tolerance (METS): >4 METS  Chart reviewed  Existing labs reviewed  Patient summary reviewed  Induction- intravenous  Postoperative Plan- Plan for postoperative opioid use  Informed Consent- Anesthetic plan and risks discussed with patient  I personally reviewed this patient with the CRNA  Discussed and agreed on the Anesthesia Plan with the CRNA  Demar Britton

## 2022-01-10 NOTE — PROGRESS NOTES
Patient HR steady in the 40's with lowest of 39  Asymptomatic  VS Stable  Dr Nohemi Ma made aware   Will CTM

## 2022-01-10 NOTE — INTERVAL H&P NOTE
H&P reviewed  After examining the patient I find no changes in the patients condition since the H&P had been written  Vitals:    01/10/22 1100   BP: 154/99   Pulse: (!) 45   Resp: 16   Temp: 98 5 °F (36 9 °C)   SpO2: 99%   Head:  Present  CVS:  RRR  Lungs:  Clear bilateral  Abdomen:  Present  Assessment:  Symptomatic osteoarthritis right knee this adult male causes pain and dysfunction despite appropriate nonsurgical treatments  Plan:   To OR for right total knee arthroplasty

## 2022-01-10 NOTE — DISCHARGE INSTRUCTIONS
Discharge Instructions - Orthopedics  Marcello Nevarez 57 y o  male MRN: [id number]  Unit/Bed#: PACU 10    Weight Bearing Status:                                           Weight bearing as tolerated right lower extremity    DVT prophylaxis  Lovenox as prescribed    Pain:  Continue analgesics as directed    Dressing Instructions:   Please keep clean, dry and intact until follow up     Appt Instructions: If you do not have your appointment, please call the clinic at [phone] t  Otherwise followup as scheduled     Contact the office sooner if you experience any increased numbness/tingling in the extremities        Miscellaneous:

## 2022-01-10 NOTE — PLAN OF CARE
Problem: PHYSICAL THERAPY ADULT  Goal: Performs mobility at highest level of function for planned discharge setting  See evaluation for individualized goals  Description: Treatment/Interventions: Functional transfer training,LE strengthening/ROM,Therapeutic exercise,Elevations,Endurance training,Patient/family training,Equipment eval/education,Bed mobility,Gait training,Spoke to nursing  Equipment Recommended: Timmy Martínez       See flowsheet documentation for full assessment, interventions and recommendations  Note: Prognosis: Good  Problem List: Decreased strength,Decreased range of motion,Decreased endurance,Impaired balance,Decreased mobility,Orthopedic restrictions,Pain  Assessment: Pt seen for high complexity PT evaluation due to decrease in functional mobility status compared to baseline  Pt with active PT eval/treat and POD#0 orders at this time  Pt is a 57 y o  M who [facility] for [facility] R TKA on 1/10/21  Pt  has a past medical history of Fractures, History of transfusion, and Hypertension  Pt resides with spouse in [facility] with 1STE  Pt presents with decreased strength, balance, endurance that contribute to limitations in bed mobility, functional transfers, functional mobiltiy  Pt requires Min A for all mobility at this time  Pt left upright in bedside chair with all needs in reach  Pt will benefit from skilled therapy in order to address current impairments and functional limitations  PT to follow pt and recommending OPPT pending progress  The patient's AM-PAC Basic Mobility Inpatient Short Form Raw Score is 18  A Raw score of greater than 16 suggests the patient may benefit from discharge to home  Please also refer to the recommendation of the Physical Therapist for safe discharge planning  Barriers to Discharge: Inaccessible home environment        PT Discharge Recommendation: Home with outpatient rehabilitation          See flowsheet documentation for full assessment

## 2022-01-10 NOTE — ANESTHESIA POSTPROCEDURE EVALUATION
Post-Op Assessment Note    CV Status:  Stable  Pain Score: 0    Pain management: adequate     Mental Status:  Alert and awake   Hydration Status:  Stable   PONV Controlled:  None   Airway Patency:  Patent      Post Op Vitals Reviewed: Yes      Staff: CRNA   Comments: Patient in PACU, airway patent, VSS  No c/o pain or nausea  No complications documented      BP   115/69    Temp   96 9   Pulse   70   Resp   17   SpO2   98% on RA

## 2022-01-10 NOTE — ANESTHESIA PROCEDURE NOTES
Peripheral Block    Patient location during procedure: holding area  Start time: 1/10/2022 12:37 PM  Reason for block: at surgeon's request and post-op pain management  Staffing  Performed: Anesthesiologist   Anesthesiologist: Michelle Cui MD  Preanesthetic Checklist  Completed: patient identified, IV checked, site marked, risks and benefits discussed, surgical consent, monitors and equipment checked, pre-op evaluation and timeout performed  Peripheral Block  Patient position: supine  Prep: ChloraPrep  Patient monitoring: continuous pulse ox and frequent blood pressure checks  Block type: ipack block  Laterality: right  Injection technique: single-shot  Procedures: ultrasound guided, Ultrasound guidance required for the procedure to increase accuracy and safety of medication placement and decrease risk of complications  Ultrasound permanent image savedropivacaine (NAROPIN) 0 2% perineural infiltration, 20 mL  Needle  Needle type: Stimuplex   Needle gauge: 20g  Needle length: 4in    Needle localization: ultrasound guidance  Test dose: negative  Assessment  Injection assessment: incremental injection, no paresthesia on injection and negative aspiration for heme  Paresthesia pain: none  Heart rate change: no  Slow fractionated injection: yes  Post-procedure:  site cleaned  patient tolerated the procedure well with no immediate complications

## 2022-01-11 VITALS
OXYGEN SATURATION: 94 % | DIASTOLIC BLOOD PRESSURE: 68 MMHG | BODY MASS INDEX: 25.73 KG/M2 | WEIGHT: 190 LBS | HEART RATE: 58 BPM | TEMPERATURE: 98.1 F | HEIGHT: 72 IN | RESPIRATION RATE: 16 BRPM | SYSTOLIC BLOOD PRESSURE: 112 MMHG

## 2022-01-11 PROBLEM — Z96.651 STATUS POST RIGHT KNEE REPLACEMENT: Status: ACTIVE | Noted: 2022-01-11

## 2022-01-11 PROBLEM — M17.11 PRIMARY OSTEOARTHRITIS OF RIGHT KNEE: Status: RESOLVED | Noted: 2020-07-02 | Resolved: 2022-01-11

## 2022-01-11 LAB
ANION GAP SERPL CALCULATED.3IONS-SCNC: 4 MMOL/L (ref 4–13)
BUN SERPL-MCNC: 13 MG/DL (ref 5–25)
CALCIUM SERPL-MCNC: 8.4 MG/DL (ref 8.3–10.1)
CHLORIDE SERPL-SCNC: 105 MMOL/L (ref 100–108)
CO2 SERPL-SCNC: 28 MMOL/L (ref 21–32)
CREAT SERPL-MCNC: 1.05 MG/DL (ref 0.6–1.3)
DME PARACHUTE DELIVERY DATE REQUESTED: NORMAL
DME PARACHUTE ITEM DESCRIPTION: NORMAL
DME PARACHUTE ORDER STATUS: NORMAL
DME PARACHUTE SUPPLIER NAME: NORMAL
DME PARACHUTE SUPPLIER PHONE: NORMAL
ERYTHROCYTE [DISTWIDTH] IN BLOOD BY AUTOMATED COUNT: 14.3 % (ref 11.6–15.1)
GFR SERPL CREATININE-BSD FRML MDRD: 75 ML/MIN/1.73SQ M
GLUCOSE SERPL-MCNC: 128 MG/DL (ref 65–140)
HCT VFR BLD AUTO: 37.1 % (ref 36.5–49.3)
HGB BLD-MCNC: 11.9 G/DL (ref 12–17)
MCH RBC QN AUTO: 30.4 PG (ref 26.8–34.3)
MCHC RBC AUTO-ENTMCNC: 32.1 G/DL (ref 31.4–37.4)
MCV RBC AUTO: 95 FL (ref 82–98)
PLATELET # BLD AUTO: 201 THOUSANDS/UL (ref 149–390)
PMV BLD AUTO: 9.9 FL (ref 8.9–12.7)
POTASSIUM SERPL-SCNC: 4.2 MMOL/L (ref 3.5–5.3)
RBC # BLD AUTO: 3.91 MILLION/UL (ref 3.88–5.62)
SODIUM SERPL-SCNC: 137 MMOL/L (ref 136–145)
WBC # BLD AUTO: 12.85 THOUSAND/UL (ref 4.31–10.16)

## 2022-01-11 PROCEDURE — 99024 POSTOP FOLLOW-UP VISIT: CPT | Performed by: PHYSICIAN ASSISTANT

## 2022-01-11 PROCEDURE — 97116 GAIT TRAINING THERAPY: CPT

## 2022-01-11 PROCEDURE — 97110 THERAPEUTIC EXERCISES: CPT

## 2022-01-11 PROCEDURE — 99214 OFFICE O/P EST MOD 30 MIN: CPT | Performed by: INTERNAL MEDICINE

## 2022-01-11 PROCEDURE — 85027 COMPLETE CBC AUTOMATED: CPT | Performed by: ORTHOPAEDIC SURGERY

## 2022-01-11 PROCEDURE — 80048 BASIC METABOLIC PNL TOTAL CA: CPT | Performed by: ORTHOPAEDIC SURGERY

## 2022-01-11 PROCEDURE — 97166 OT EVAL MOD COMPLEX 45 MIN: CPT

## 2022-01-11 PROCEDURE — NC001 PR NO CHARGE: Performed by: ORTHOPAEDIC SURGERY

## 2022-01-11 RX ADMIN — OXYCODONE HYDROCHLORIDE 10 MG: 10 TABLET ORAL at 08:11

## 2022-01-11 RX ADMIN — CEFAZOLIN SODIUM 2000 MG: 2 SOLUTION INTRAVENOUS at 05:02

## 2022-01-11 RX ADMIN — METHOCARBAMOL 500 MG: 500 TABLET, FILM COATED ORAL at 05:02

## 2022-01-11 RX ADMIN — ACETAMINOPHEN 975 MG: 325 TABLET, FILM COATED ORAL at 08:11

## 2022-01-11 RX ADMIN — IRON SUCROSE 200 MG: 20 INJECTION, SOLUTION INTRAVENOUS at 09:52

## 2022-01-11 RX ADMIN — OXYCODONE HYDROCHLORIDE 10 MG: 10 TABLET ORAL at 12:20

## 2022-01-11 RX ADMIN — STANDARDIZED SENNA CONCENTRATE 8.6 MG: 8.6 TABLET ORAL at 08:11

## 2022-01-11 RX ADMIN — ENOXAPARIN SODIUM 40 MG: 40 INJECTION SUBCUTANEOUS at 03:31

## 2022-01-11 RX ADMIN — METHOCARBAMOL 500 MG: 500 TABLET, FILM COATED ORAL at 12:20

## 2022-01-11 RX ADMIN — OXYCODONE HYDROCHLORIDE 10 MG: 10 TABLET ORAL at 03:37

## 2022-01-11 RX ADMIN — DOCUSATE SODIUM 100 MG: 100 CAPSULE ORAL at 08:11

## 2022-01-11 NOTE — PHYSICAL THERAPY NOTE
Physical Therapy Treatment Note    Patient's Name: Esau Rhoades  : 22 0845   PT Last Visit   PT Visit Date 22   Note Type   Note Type BID visit/treatment   Pain Assessment   Pain Assessment Tool 0-10   Pain Score 5   Pain Location/Orientation Orientation: Right;Location: Knee   Hospital Pain Intervention(s) Cold applied; Emotional support  (pre-medicated by RN)   Precautions   Total Knee Replacement   (s/p R TKA 1/10/22)   Restrictions/Precautions   Weight Bearing Precautions Per Order Yes   RLE Weight Bearing Per Order WBAT   Other Precautions Pain  (Hemovac)   General   Chart Reviewed Yes   Response to Previous Treatment Patient with no complaints from previous session  Family/Caregiver Present No   Cognition   Orientation Level Oriented X4   Subjective   Subjective Pt agreeable to mobilize  Pt confirmed that he feels confident that he can manage at home following today's PT session  Bed Mobility   Additional Comments Pt greeted seated in chair  Transfers   Sit to Stand 5  Supervision   Additional items Increased time required   Stand to Sit 5  Supervision   Additional items Increased time required   Stand pivot 5  Supervision   Car transfer   (verbally reviewed car t/f)   Additional Comments RW   Ambulation/Elevation   Gait pattern Excessively slow;Decreased heel strike; Improper Weight shift; Antalgic;Decreased foot clearance  (decreased R knee EXT w/ stance, early heel-off RLE)   Gait Assistance 5  Supervision   Additional items Verbal cues  (visual cues)   Assistive Device Rolling walker   Distance 40' + stairs + 150'   Stair Management Assistance 5  Supervision   Additional items Verbal cues   Stair Management Technique Two rails; Step to pattern   Number of Stairs 5   Balance   Static Sitting Good   Dynamic Sitting Fair +   Static Standing Fair   Dynamic Standing Fair   Ambulatory Fair -  (RW)   Endurance Deficit   Endurance Deficit Yes   Endurance Deficit Description pain, weakness   Activity Tolerance   Activity Tolerance Patient tolerated treatment well;Patient limited by pain   Nurse Made Aware RN cleared pt for PT + updated at end of session  Exercises   Quad Sets Sitting;10 reps;AROM; Right  (long-sitting in recliner chair)   Knee AROM Long Arc Quad Sitting;10 reps;AROM; Right   Ankle Pumps Sitting;20 reps;AROM; Bilateral   Assessment   Prognosis Excellent   Problem List Decreased strength;Decreased range of motion;Decreased endurance; Impaired balance;Decreased mobility;Orthopedic restrictions;Pain   Assessment Pt seen for PT session w/ focus on HEP instruction, education for home s/p TKR, transfer training, gait training, and stair training  Pt w/ good return demo of HEP, further instruction to ambulate q hour while at home to decrease risk of blood clots  Also educated pt on proper positioning of RLE, either elevated w/ towel roll under R ankle (never pillow under knee) or sitting w/ R knee flexed at 90 degrees  Gait training w/ extensive cueing on landing w/ R heel strike and knee in full extension, increase R knee flexion w/ swing  Stair training w/ good return demo of step-to technique  Verbally reviewed car t/f and pt verbalized understanding  Pt w/o questions after PT  Pt cleared for d/c home from PT standpoint  Updated RN Liz Cid, and MITZI Haines at end of session  Barriers to Discharge None   Goals   Patient Goals go home   PT Treatment Day 1   Plan   Treatment/Interventions Functional transfer training;LE strengthening/ROM; Elevations; Therapeutic exercise; Endurance training;Patient/family training;Equipment eval/education; Bed mobility;Gait training; Compensatory technique education;Spoke to nursing;Spoke to case management;Spoke to advanced practitioner   Progress Progressing toward goals   Recommendation   PT Discharge Recommendation Home with outpatient rehabilitation   Equipment Recommended [facility] Recommended Wheeled walker Change/add to [employer]? No   Additional Comments cleared for home w/ OPPT from PT standpoint   Pedritopatric 8 in Bed Without Bedrails 3   Lying on Back to Sitting on Edge of Flat Bed 3   Moving Bed to Chair 3   Standing Up From Chair 3   Walk in Room 3   Climb 3-5 Stairs 3   Basic Mobility Inpatient Raw Score 18   Basic Mobility Standardized Score 41 05   Highest Level Of Mobility   -[facility] Goal 6: Walk 10 steps or more   Education   Education Provided Mobility training;Home exercise program;Assistive device   Patient Demonstrates acceptance/verbal understanding;Reinforcement needed   End of Consult   Patient Position at End of Consult Bedside chair; All needs within reach  (all lines in tact, ice machine R knee, BLE elevated)     Frankie Burrell, PT, DPT

## 2022-01-11 NOTE — PLAN OF CARE
Problem: PHYSICAL THERAPY ADULT  Goal: Performs mobility at highest level of function for planned discharge setting  See evaluation for individualized goals  Description: Treatment/Interventions: Functional transfer training,LE strengthening/ROM,Therapeutic exercise,Elevations,Endurance training,Patient/family training,Equipment eval/education,Bed mobility,Gait training,Spoke to nursing  Equipment Recommended: Juan Miguel Arias       See flowsheet documentation for full assessment, interventions and recommendations  Outcome: Progressing  Note: Prognosis: Excellent  Problem List: Decreased strength,Decreased range of motion,Decreased endurance,Impaired balance,Decreased mobility,Orthopedic restrictions,Pain  Assessment: Pt seen for PT session w/ focus on HEP instruction, education for home s/p TKR, transfer training, gait training, and stair training  Pt w/ good return demo of HEP, further instruction to ambulate q hour while at home to decrease risk of blood clots  Also educated pt on proper positioning of RLE, either elevated w/ towel roll under R ankle (never pillow under knee) or sitting w/ R knee flexed at 90 degrees  Gait training w/ extensive cueing on landing w/ R heel strike and knee in full extension, increase R knee flexion w/ swing  Stair training w/ good return demo of step-to technique  Verbally reviewed car t/f and pt verbalized understanding  Pt w/o questions after PT  Pt cleared for d/c home from PT standpoint  Updated RN Dianne Knowles, and MITZI Haines at end of session  Barriers to Discharge: None        PT Discharge Recommendation: Home with outpatient rehabilitation          See flowsheet documentation for full assessment

## 2022-01-11 NOTE — PROGRESS NOTES
Orthopedics Progress / Post-op Note  Thierno Rubalcava 57 y o  male MRN: [id number]  Unit/Bed#: -01      Subjective:  57 y o male POD 1 s/p right total knee arthroplasty  No acute events overnight   Pain is currently controlled in the right lower extremity without with associated numbness and tingling        Objective:    Labs:  0   Lab Value Date/Time    HCT 37 1 01/11/2022 0619    HCT 44 9 12/13/2021 1027    HGB 11 9 (L) 01/11/2022 0619    HGB 14 8 12/13/2021 1027    INR 1 00 12/13/2021 1027    WBC 12 85 (H) 01/11/2022 0619    WBC 7 80 12/13/2021 1027       Meds:    Current Facility-Administered Medications:     acetaminophen (TYLENOL) tablet 975 mg, 975 mg, Oral, Q8H, Cheko Dumont MD, 975 mg at 01/10/22 2335    aluminum-magnesium hydroxide-simethicone (MYLANTA) oral suspension 30 mL, 30 mL, Oral, Q6H PRN, Cheko Dumont MD    docusate sodium (COLACE) capsule 100 mg, 100 mg, Oral, BID, Cheko Dumont MD, 100 mg at 01/10/22 1704    enoxaparin (LOVENOX) subcutaneous injection 40 mg, 40 mg, Subcutaneous, Daily, Cheko Dumont MD, 40 mg at 01/11/22 0331    hydrALAZINE (APRESOLINE) tablet 25 mg, 25 mg, Oral, Q8H PRN, June Riddle PA-C    HYDROmorphone (DILAUDID) injection 0 5 mg, 0 5 mg, Intravenous, Q2H PRN, Cheko Dumont MD    lactated ringers bolus 1,000 mL, 1,000 mL, Intravenous, Once PRN **AND** lactated ringers bolus 1,000 mL, 1,000 mL, Intravenous, Once PRN, Cheko Dumont MD    lactated ringers infusion, 125 mL/hr, Intravenous, Continuous, Serge Ng MD, Last Rate: 125 mL/hr at 01/10/22 1427, 125 mL/hr at 01/10/22 1427    lactated ringers infusion, 1 5 mL/kg/hr, Intravenous, Continuous, Cheko Dumont MD, Stopped at 01/11/22 0505    methocarbamol (ROBAXIN) tablet 500 mg, 500 mg, Oral, Q6H [address], Cheko Dumont MD, 500 mg at 01/11/22 0502    ondansetron (ZOFRAN) injection 4 mg, 4 mg, Intravenous, Q6H PRN, June Riddle PA-C    oxyCODONE (ROXICODONE) immediate release tablet 10 mg, 10 mg, Oral, Q4H PRN, Tammie Quigley MD, 10 mg at 01/11/22 9237    oxyCODONE (ROXICODONE) IR tablet 5 mg, 5 mg, Oral, Q4H PRN, Tammie Quigley MD    [facility]) tablet 8 6 mg, 1 tablet, Oral, Daily, Tammie Quigley MD    sodium chloride 0 9 % bolus 1,000 mL, 1,000 mL, Intravenous, Once PRN **AND** sodium chloride 0 9 % bolus 1,000 mL, 1,000 mL, Intravenous, Once PRN, Tammie Quigley MD    Blood Culture:   No results found for: BLOODCX    Wound Culture:   No results found for: WOUNDCULT    Ins and Outs:  I/O last 24 hours: In: 1602 [P O :420; I V :2964; IV Piggyback:100]  Out: 7216 [Urine:1700; Drains:625; Blood:100]      Orthopedic Physical Exam:  Vitals:    01/11/22 0325   BP: 129/75   Pulse: 58   Resp: 16   Temp: 98 3 °F (36 8 °C)   SpO2: 97%   Sitting upright in chair, comfortable at rest   Breathing unlabored  right Lower Extremity extremity:  · ACE/Dressings C/D/I  · Hemovac drain x1 holding suction with bloody drainage in canister   · +ankle DF/PF, EHL/FHL  · Toes warm, sensate, mobile, with good cap refill  · No calf tenderness or pitting edema    _*_*_*_*_*_*_*_*_*_*_*_*_*_*_*_*_*_*_*_*_*_*_*_*_*_*_*_*_*_*_*_*_*_*_*_*_*_*_*_*_*    Assessment: 61 y o male postop day 1 status post right total knee arthroplasty with robot  Doing well  Plan:  · WBAT RLE  · Up and out of bed  · Drain pull later today  · DVT prophylaxis (Lovenox)  · Analgesics  · PT/OT  · Dispo: Ortho will follow  Discharge planning  · Will continue to assess for acute blood loss anemia  Labs currently pending, will follow up          Kody Mckeon PA-C

## 2022-01-11 NOTE — OCCUPATIONAL THERAPY NOTE
Occupational Therapy Evaluation     Patient Name: Zuri FISHMAN Date: 1/11/2022  Problem List  Principal Problem:    Status post right knee replacement    Past Medical History  Past Medical History:   Diagnosis Date    Fractures     History of transfusion     Hypertension      Past Surgical History  Past Surgical History:   Procedure Laterality Date    ABDOMINAL SURGERY      KNEE ARTHROSCOPY      KNEE SURGERY      ROTATOR CUFF REPAIR      SHOULDER SURGERY      SPLENECTOMY          01/11/22 1005   OT Last Visit   OT Visit Date 01/11/22   Note Type   Note type Evaluation   Restrictions/Precautions   Weight Bearing Precautions Per Order Yes   RUE Weight Bearing Per Order WBAT   LUE Weight Bearing Per Order WBAT   RLE Weight Bearing Per Order WBAT   LLE Weight Bearing Per Order WBAT   Other Precautions WBS   Pain Assessment   Pain Assessment Tool 0-10   Pain Score 2   Pain Location/Orientation Location: Knee;Orientation: Right   Home Living   Type of [address] [place] [address] Two level; Able to live on main level with bedroom/bathroom  (1 MITCH)   Bathroom Shower/Tub Walk-in shower   Bathroom Toilet Standard   Bathroom Equipment Commode;Built-in shower seat   Home Equipment Walker;Cane   Prior Function   Level of [place] Independent with ADLs and functional mobility   Lives With Spouse   ADL Assistance Independent   IADLs Independent   Falls in the last 6 months 0   Vocational Retired   Lifestyle   Autonomy Pt was I with ADLs, IADLs, and functional mobility pta   Reciprocal Relationships Supportive family who will assist him prn   Service to Others Retired   Intrinsic Gratification Active pta   ADL   Eating Assistance 7  Independent   Grooming Assistance 5  [address] [facility] 5  [address] [facility] 5  [facility] 66  5  [facility] 66  5  [address] Supervision/Setup   Bed Mobility   Additional Comments OOB in chair upon OT arrival   Transfers   Sit to Stand 5  Supervision   Stand to Sit 5  Supervision   Functional Mobility   Functional Mobility 5  Supervision   Additional items Rolling walker   Activity Tolerance   Activity Tolerance Patient limited by pain   RUE Assessment   RUE Assessment WFL   LUE Assessment   LUE Assessment WFL   Cognition   Overall Cognitive Status WFL   Arousal/Participation Alert; Responsive;Arousable; Cooperative   Attention Within functional limits   Orientation Level Oriented X4   Memory Within functional limits   Following Commands Follows all commands and directions without difficulty   Assessment   Limitation Decreased ADL status; Decreased high-level ADLs   Prognosis Good   Assessment Pt is a 57 y o  male who was admitted to [facility] on 1/10/2022 with S/p right knee replacement   Pt's problem list also includes PMH of HTN and prior surgery  At baseline pt was completing ADLs, IADLs, and functional mobility independently  Pt lives with spouse in a two-story home with 1 MITCH  Pt will be able to stay on the first floor when he returns home  Currently pt requires supervision for overall ADLS and supervision for functional mobility/transfers  Pt currently presents with impairments in the following categories -steps to enter environment, difficulty performing ADLS and environment activity tolerance, endurance and standing balance/tolerance  These impairments, as well as pt's fatigue, pain, WBS  and home environment  limit pt's ability to safely engage in all baseline areas of occupation, includingdressing, functional mobility/transfers, community mobility, laundry , social participation  and leisure activities  From OT standpoint, recommend home with outpatient rehabilitation upon D/C  D/c from OT caseload     Goals   Patient Goals To go home   Plan   OT Frequency Eval only   Recommendation   OT Discharge Recommendation Home with outpatient rehabilitation   OT - OK to Discharge Yes   AM-PAC Daily Activity Inpatient   Lower Body Dressing 3   Bathing 3   Toileting 3   Upper Body Dressing 4   Grooming 3   Eating 4   Daily Activity Raw Score 20   Daily Activity Standardized Score (Calc for Raw Score >=11) 42 03   AM-PAC Applied Cognition Inpatient   Following a Speech/Presentation 4   Understanding Ordinary Conversation 4   Taking Medications 4   Remembering Where Things Are Placed or Put Away 4   Remembering List of 4-5 Errands 4   Taking Care of Complicated Tasks 4   Applied Cognition Raw Score 24   Applied Cognition Standardized Score 62 21   The patient's raw score on the AM-PAC Daily Activity inpatient short form is 20, standardized score is 42 03, greater than 39 4  Patients at this level are likely to benefit from discharge to home  Please refer to the recommendation of the Occupational Therapist for safe discharge planning      Marva De La Rosa

## 2022-01-11 NOTE — PROGRESS NOTES
Internal Medicine Progress Note  Patient: Kerri Chen  Age/sex: 57 y o  male  Medical Record #: [id number]      ASSESSMENT/PLAN: (Interval History)  Kerri Chen is seen and examined and management for following issues:    Status Post right Total KNEE ARTHROPLASTY   Pain controlled   Continue encourage incentive spirometry; monitor fever curve   DVT prophylaxis in place and reviewed  Results from last 7 days   Lab Units 01/11/22  0619   WBC Thousand/uL 12 85*   HEMOGLOBIN g/dL 11 9*   HEMATOCRIT % 37 1   PLATELETS Thousands/uL 201          Operative acute blood loss anemia  · Decrease in hgb levels from preop labs results; suspect due to post operation extravasation losses along with potential dilutional effects of fluids  · Initiate surgery optimization venofer protocol/transfusion  · Transfuse PRBC if hgb less then 8 0 (per ortho protocol)  or symptomatic  · Monitor follow up CBC post intervention to trend effect    Hypertension  · Hold Zestril post op to decrease risk of MIROSLAVA;   · may resume after 48 hrs or upon DC if needed  · Hydralazine 25 mg q 8 prn SBP greater then 150      OK for dc from medicine standpoint  PRE-OP HGB LEVEL: 14 8  The above assessment and plan was reviewed and updated as determined by my evaluation of the patient on 1/11/2022      Labs:   Results from last 7 days   Lab Units 01/11/22  0619   WBC Thousand/uL 12 85*   HEMOGLOBIN g/dL 11 9*   HEMATOCRIT % 37 1   PLATELETS Thousands/uL 201     Results from last 7 days   Lab Units 01/11/22  0619   SODIUM mmol/L 137   POTASSIUM mmol/L 4 2   CHLORIDE mmol/L 105   CO2 mmol/L 28   BUN mg/dL 13   CREATININE mg/dL 1 05   CALCIUM mg/dL 8 4                   Review of Scheduled Meds:  Current Facility-Administered Medications   Medication Dose Route Frequency Provider Last Rate    acetaminophen  975 mg Oral Q8H Todd Rey MD      aluminum-magnesium hydroxide-simethicone  30 mL Oral Q6H PRN Todd Rey MD      docusate sodium 100 mg Oral BID Tammie Quigley MD      enoxaparin  40 mg Subcutaneous Daily Tammie Quigley MD      hydrALAZINE  25 mg Oral Q8H PRN June Riddle PA-C      HYDROmorphone  0 5 mg Intravenous Q2H PRN Tammie Quigley MD      lactated ringers  1,000 mL Intravenous Once PRN Tammie Quigley MD      And    lactated ringers  1,000 mL Intravenous Once PRN Tammie Quigley MD      lactated ringers  125 mL/hr Intravenous Continuous Robert Cho  mL/hr (01/10/22 1427)    lactated ringers  1 5 mL/kg/hr Intravenous Continuous Tammie Quigley MD Stopped (01/11/22 0505)    methocarbamol  500 mg Oral Q6H [facility] Tammie Quigley MD      ondansetron  4 mg Intravenous Q6H PRN June Riddle PA-C      oxyCODONE  10 mg Oral Q4H PRN Tammie Quigley MD      oxyCODONE  5 mg Oral Q4H PRN Tammie Quigley MD      senna  1 tablet Oral Daily Tammie Quigley MD      sodium chloride  1,000 mL Intravenous Once PRN Tammie Quigley MD      And    sodium chloride  1,000 mL Intravenous Once PRN Tammie Quigley MD         Subjective/ HPI: Patient seen and examined  Patients overnight issues or events were reviewed with nursing or staff during rounds or morning huddle session  New or overnight issues include the following:     Pt without any overnight events or reported nursing issues      ROS:   A 10 point ROS was performed; negative except as noted above         Imaging:     No orders to display       *Labs /Radiology studies Reviewed  *Medications  reviewed and reconciled as needed  *Please refer to order section for additional ordered labs studies  *Case discussed with primary attending during morning huddle case rounds    Physical Examination:  Vitals:   Vitals:    01/10/22 2032 01/10/22 2231 01/11/22 0325 01/11/22 0751   BP: 126/74 111/68 129/75 112/68   Pulse: 56 61 58 58   Resp: 16 16 16    Temp: 98 6 °F (37 °C) 99 2 °F (37 3 °C) 98 3 °F (36 8 °C) 98 1 °F (36 7 °C)   TempSrc:       SpO2: 98% 97% 97% 94%   Weight:       Height: General Appearance: no distress, conversive  HEENT: PERRLA, conjuctiva normal; oropharynx clear; mucous membranes moist;   Neck:  Supple, no lymphadenopathy or thyromegaly  Lungs: CTA, normal respiratory effort, no retractions, expiratory effort normal  CV: regular rate and rhythm , PMI normal   ABD: soft non tender, no masses , no hepatic or splenomegaly  EXT: DP pulses intact, no lymphadenopathy, no edema; right knee drain and surgical dressing in place  Skin: normal turgor, normal texture, no rash  Psych: affect normal, mood normal  Neuro: AAOx3    : barron removed ; due to void    The above physical exam was reviewed and updated as determined by my evaluation of the patient on 1/11/2022  Invasive Devices  Report    Peripheral Intravenous Line            Peripheral IV 01/10/22 Right Antecubital <1 day          Drain            Closed/Suction Drain Anterior;Right Knee Accordion 10 Fr  <1 day                   VTE Pharmacologic Prophylaxis: Enoxaparin  Code Status: No Order  Current Length of Stay: 0 day(s)      Total floor / unit time spent today 30 minutes  Coordination of patient's care was performed in conjunction with primary service  Time invested included review of patient's labs, vitals, and management of their comorbidities with continued monitoring, examination of patient as well as answering patient questions, documenting her findings and creating progress note in electronic medical record,  ordering appropriate diagnostic testing  ** Please Note:  voice to text software may have been used in the creation of this document   Although proof errors in transcription or interpretation are a potential of such software**

## 2022-01-11 NOTE — DISCHARGE SUMMARY
ORTHOPEDICS DISCHARGE SUMMARY  Mindy Dobbs 57 y o  male MRN: [id number]  Unit/Bed#: -45    Attending Physician: Leta Pandya    Admitting diagnosis: Primary osteoarthritis of right knee [M17 11]    Discharge diagnosis: Primary osteoarthritis of right knee [M17 11]    Date of admission: 1/10/2022    Date of discharge: 01/11/22         Procedure: Arthroplasty Knee Total with Robot (Right)     HPI:  This is a 57y o  year old male that presented to the office with signs and symptoms of right knee osteoarthritis  They tried and failed conservative treatment measures and wished to proceed with surgical intervention  The risks, benefits, and complications of the procedure were discussed with the patient and informed consent was obtained  Hospital Course: The patient was admitted to the hospital on 1/10/2022 and underwent an uncomplicated right total knee arthroplasty  They were transferred to the floor after a brief stay in the post-anesthesia care unit  Their pain was well managed with IV and oral pain medications  They began therapy on post operative day #1  Lovenox was also started for DVT prophylaxis 12 hours post operatively  Hemovac drain was removed on POD1    On discharge date pt was cleared by PT and the medicine team and determined to be safe for discharge  Daily discussion was had with the patient, nursing staff, orthopaedic team, and family members if present  All questions were answered to the patients satisifaction  0   Lab Value Date/Time    HGB 11 9 (L) 01/11/2022 0619    HGB 14 8 12/13/2021 1027       Greater than 2 gram drop which qualifies for diagnosis of acute blood loss anemia  Vital signs remained stable and pt was resuscitated with IVF as needed   Body mass index is 25 77 kg/m²  Non obese  Recommend behavior modifications, nutrition and physical activity  Discharge Instructions: The patient was discharged weight bearing as tolerated to the right lower extremity   Lovenox will be continued for 28 days  Continue PT/OT  Take pain medications as instructed  Discharge Medications: For the complete list of discharge medications, please refer to the patient's medication reconciliation

## 2022-01-12 ENCOUNTER — NURSE TRIAGE (OUTPATIENT)
Dept: OTHER | Facility: OTHER | Age: 62
End: 2022-01-12

## 2022-01-13 ENCOUNTER — TELEPHONE (OUTPATIENT)
Dept: OBGYN CLINIC | Facility: HOSPITAL | Age: 62
End: 2022-01-13

## 2022-01-13 ENCOUNTER — PATIENT OUTREACH (OUTPATIENT)
Dept: OBGYN CLINIC | Facility: HOSPITAL | Age: 62
End: 2022-01-13

## 2022-01-13 NOTE — TELEPHONE ENCOUNTER
Reason for Disposition   [1] Caller has URGENT question AND [2] triager unable to answer question    Answer Assessment - Initial Assessment Questions  1  SYMPTOM: "What's the main symptom you're concerned about?" (e g , pain, fever, vomiting)      Right knee swelling    2  ONSET: "When did S/S  start?"      Tonight    3  SURGERY: "What surgery was performed?"      Right knee total knee replacement    4  DATE of SURGERY: "When was surgery performed?"       1/10/21    5  ANESTHESIA: " What type of anesthesia did you have?" (e g , general, spinal, epidural, local)      General    6  PAIN: "Is there any pain?" If Yes, ask: "How bad is it?"  (Scale 1-10; or mild, moderate, severe)      Denies    7  FEVER: "Do you have a fever?" If Yes, ask: "What is your temperature, how was it measured, and when did it start?"      denies    8  VOMITING: "Is there any vomiting?" If yes, ask: "How many times?"      Denies    9  BLEEDING: "Is there any bleeding?" If Yes, ask: "How much?" and "Where?"      Denies    10   OTHER SYMPTOMS: "Do you have any other symptoms?" (e g , drainage from wound, painful urination, constipation)        denies    Protocols used: POST-OP SYMPTOMS AND QUESTIONS-[facility]

## 2022-01-13 NOTE — TELEPHONE ENCOUNTER
Further review with appropriate people should occur regarding your last sentence  The emergency room is a highly charged environment currently with sick people      Your last sentence does not surprise me    We need to be  "stewards" and judicious about our referrals, at the current time    JOÃO

## 2022-01-13 NOTE — TELEPHONE ENCOUNTER
Spoke to patient, full postoperative assessment will be documented in encounter by myself  Regarding swelling, he reports swelling below the knee, denies worsening  Denies redness or warmth  He reports he has been icing, but only to the knee  We discussed swelling can occur following TKA for 7-10 days, and to ice the areas of pain and swelling  He is aware to monitor for signs of redness, swelling, drainage and/or fevers, all of which he denies  On call did recommend patient go to the ED "to be safe" per patient, he reports he waited 2 hours, and left without being seen

## 2022-01-13 NOTE — PROGRESS NOTES
Patient contacted this morning, after multiple calls in 2 different team members, reporting swelling  He also called into the on-call last night, and was advised to go to the emergency department, he reports he did go but after 2 hours left and reported he would call us this morning  He reports swelling is noted below the knee and denies any redness, or warmth to those areas  He reports he is icing at the knee, it was educated to apply ice to all areas of pain and swelling including, the areas of swelling below the knee  He reports his dressing is dry and denies any drainage  He is scheduled for outpatient therapy tomorrow  We reviewed his after visit summary medication list, he reports current 9/10 pain as he just woke up  He is taking oxycodone 5 mg every 4 hours, and Lovenox daily  We did discuss starting to take Tylenol, with a max dose of 3000 mg in 24 hours  The surgeon was notified of the patient's swelling concerns in a separate telephone encounter that the patient use to call in last evening  Surgeon has been made aware

## 2022-01-13 NOTE — TELEPHONE ENCOUNTER
Regarding: knee replacement sx monday/swelling   ----- Message from [facility] sent at 1/12/2022  8:41 PM EST -----  "I am calling because I had a knee replacement on Monday with Dr Megha Rausch and the lower part of my leg has gotten real swollen "

## 2022-01-13 NOTE — TELEPHONE ENCOUNTER
Patient sees Dr Cecilia Albrecht  Patient is calling in stating that he had surgery on 1/10/22 to his right knee and he is stating that last night on his right side going down his knee it is all swollen going down his knee, he is asking what further he should be doing for this  Patient denied hot to touch or red around area  Please advise        Call back# [phone]

## 2022-01-14 ENCOUNTER — OFFICE VISIT (OUTPATIENT)
Dept: PHYSICAL THERAPY | Facility: CLINIC | Age: 62
End: 2022-01-14
Payer: COMMERCIAL

## 2022-01-14 DIAGNOSIS — M17.11 PRIMARY OSTEOARTHRITIS OF RIGHT KNEE: Primary | ICD-10-CM

## 2022-01-14 DIAGNOSIS — Z96.651 STATUS POST TOTAL RIGHT KNEE REPLACEMENT: ICD-10-CM

## 2022-01-14 PROCEDURE — 97110 THERAPEUTIC EXERCISES: CPT | Performed by: PHYSICAL THERAPIST

## 2022-01-14 PROCEDURE — 97162 PT EVAL MOD COMPLEX 30 MIN: CPT | Performed by: PHYSICAL THERAPIST

## 2022-01-14 NOTE — PROGRESS NOTES
PT Evaluation     Today's date: 2022  Patient name: Dagoberto Torres  : 1960  MRN: [id number]  Referring provider: Lex Marie MD  Dx:   Encounter Diagnosis     ICD-10-CM    1  Primary osteoarthritis of right knee  M17 11    2  Status post total right knee replacement  Z96 651                   Assessment  Assessment details: Dagoberto Torres is a 57 y o  male presenting to physical therapy s/p right TKA on 1/10/2022 and presents with pain, decreased range of motion, decreased strength, gait/balance dysfunction and decreased activity tolerance  Assessment reveals significant quad inhibition with weakness and PROM/AROM deficits  Secondary to these impairments, patient has increased difficulty performing ADL's, household chores and  work related tasks  Adriana Parham would benefit from skilled PT to address these issues and maximize function    Thank you for the referral     Impairments: abnormal gait, abnormal or restricted ROM, abnormal movement, activity intolerance, impaired balance, impaired physical strength, pain with function and weight-bearing intolerance  Understanding of Dx/Px/POC: excellent  Goals  Short Term Goals: to be achieved by 4 weeks  1) Patient will be independent with initial HEP  2) Decrease pain at worst by 2 points on NPRS  3) Increase knee flexion PROM to 100 degrees  4) Increase knee extension PROM to < or equal to +10 degrees  5) Increase LE strength by 1/2 MMT grade in all deficient planes  6) Patient to negotiate steps with a reciprocal pattern with use of handrail    Long Term Goals: to be achieved by discharge  1) Increase FOTO score > or equal to expected outcome  2) Patient to be independent with comprehensive HEP  3) Abolish pain for improved quality of life  4) Achieve full knee extension ROM to improve a/iadls  5) Increase knee flexion ROM to > or equal to 120 degrees  6) Patient to negotiate steps with a reciprocal pattern without use of handrail  7) Patient to report no sleep interruption secondary to pain    Plan  Patient would benefit from: skilled PT  Planned therapy interventions: joint mobilization, manual therapy, neuromuscular re-education, patient education, strengthening, stretching, therapeutic activities, therapeutic exercise, home exercise program, gait training, functional ROM exercises, balance/weight bearing training and ADL training  Frequency: 2x week  Duration in weeks: 8  Treatment plan discussed with: patient        Subjective Evaluation    History of Present Illness  Mechanism of injury: Patient presents to outpatient PT for a pre-op visit for a right TKA scheduled for 01/10/2021  Patient notes that his current pain limits him from normal ADL's and leisure functions including steps, walking downhill, carrying objects, squatting with weight and uneven surfaces  Patient is retired but stays active via hunting, fishing and hiking  Patients goals for pre-op PT are to gain a better understanding of the post-procedure process and following surgery rehab to achieve functional goals and decrease pain  2022: Patient reports back to outpatient PT following his total knee replacement on 01/10/2022  Patient was admitted for a 1 day stay and d/c to home  Patient states that he has been compliant with his HEP several times per day              Recurrent probem    Quality of life: excellent    Pain  Current pain ratin  At best pain ratin  At worst pain rating: 10  Quality: dull ache, tight and sharp  Relieving factors: rest and relaxation  Aggravating factors: stair climbing and walking  Progression: worsening    Social Support  Steps to enter house: yes  Stairs in house: yes   Lives in: multiple-level home  Lives with: spouse    Employment status: working  Hand dominance: right      Diagnostic Tests  X-ray: abnormal  Treatments  Previous treatment: injection treatment and medication  Current treatment: medication  Patient Goals  Patient goals for therapy: increased strength, independence with ADLs/IADLs, return to sport/leisure activities, return to work, increased motion and decreased pain          Objective     Tenderness     Right Knee   Tenderness in the lateral joint line       Neurological Testing     Additional Neurological Details  (-) Homans sign    Active Range of Motion   Left Knee   Flexion: WFL  Extension: WFL    Right Knee   Flexion: 70 degrees with pain  Extension: 17 degrees with pain    Passive Range of Motion     Right Knee   Flexion: 75 degrees with pain  Extension: 15 degrees with pain    Mobility   Patellar Mobility:     Right Knee   Hypomobile: medial, lateral, superior and inferior     Strength/Myotome Testing     Left Knee   Flexion: 5  Extension: 5    Right Knee   Quadriceps contraction: poor    Tests     Additional Tests Details  5XSTS: 16 31 seconds without UE assist (2022: 22 73 seconds w/ B/L UE assist)  TU 55 seconds without AD or UE assist (2022: 19 02 seconds)    Ambulation     Ambulation: Level Surfaces     Additional Level Surfaces Ambulation Details  Gait assessment: Patient utilizing a RW with slow gait speed and decreased knee flexion during swing with decreased knee extension in terminal stance             Precautions: R TKA scheduled for 01/10/2022      Manuals            R knee PROM              Patellar mobs             Scar mobs                          Neuro Re-Ed             Quad activation techniques             Quad sets             Weight shifts                                                                 Ther Ex             Heel slides  2x10 x10"           Quad sets  2x10 x5"           SAQ  2x10           Rec bike             Mini squats             HEP review 15'                                      Ther Activity                                       Gait Training                                       Modalities

## 2022-01-18 ENCOUNTER — OFFICE VISIT (OUTPATIENT)
Dept: PHYSICAL THERAPY | Facility: CLINIC | Age: 62
End: 2022-01-18
Payer: COMMERCIAL

## 2022-01-18 DIAGNOSIS — Z96.651 S/P TOTAL KNEE ARTHROPLASTY, RIGHT: ICD-10-CM

## 2022-01-18 PROCEDURE — 97110 THERAPEUTIC EXERCISES: CPT | Performed by: PHYSICAL THERAPIST

## 2022-01-18 PROCEDURE — 97140 MANUAL THERAPY 1/> REGIONS: CPT | Performed by: PHYSICAL THERAPIST

## 2022-01-18 PROCEDURE — 97112 NEUROMUSCULAR REEDUCATION: CPT | Performed by: PHYSICAL THERAPIST

## 2022-01-18 NOTE — PROGRESS NOTES
Daily Note     Today's date: 2022  Patient name: Mindy Dobbs  : 1960  MRN: [id number]  Referring provider: Lizabeth Gomez MD  Dx:   Encounter Diagnosis     ICD-10-CM    1  S/P total knee arthroplasty, right  Z96 651 Ambulatory referral to Physical Therapy                  Subjective: Patient reports overall improvement in his knee since last visit mentioning that it feels stronger already  He notes decrease in swelling and compliance with HEP  Objective: See treatment diary below  R knee flexion: 85 degrees  R knee extension: 20 degrees of flexion    Assessment: Tolerated treatment well  Improvement in R knee ROM made throughout manual PROM stretching  Patient has minimal swelling and restriction of patellar mobility due to the remainder of swelling  He remains challenged with exercises involving full knee knee extension due to limitation in extension ROM and weakness of quadriceps  Able to maintain good contraction of quadriceps with tactile and verbal cueing to engage the muscle  Patient did well with 4 in stair able to perform step to pattern with use of B/L railings  Plan: Continue per plan of care        Precautions: R TKA scheduled for 01/10/2022      Manuals           R knee PROM    15'          Patellar mobs   Gr 2          Scar mobs                          Neuro Re-Ed             Quad activation techniques             Quad sets   2x10x5"          Weight shifts   2x10          Cueing    5'                                                 Ther Ex             Heel slides  2x10 x10" 2x10 x10"          Quad sets  2x10 x5"           SAQ  2x10 2x10          Rec bike   Rocking  5'          Mini squats   2x10          HEP review 15'                                      Ther Activity             4in step   10x                       Gait Training                                       Modalities                                          Patient treated by Surekha Coker, SPT under direct PT supervision

## 2022-01-21 ENCOUNTER — OFFICE VISIT (OUTPATIENT)
Dept: PHYSICAL THERAPY | Facility: CLINIC | Age: 62
End: 2022-01-21
Payer: COMMERCIAL

## 2022-01-21 DIAGNOSIS — Z96.651 S/P TOTAL KNEE ARTHROPLASTY, RIGHT: Primary | ICD-10-CM

## 2022-01-21 PROCEDURE — 97530 THERAPEUTIC ACTIVITIES: CPT | Performed by: PHYSICAL THERAPIST

## 2022-01-21 PROCEDURE — 97140 MANUAL THERAPY 1/> REGIONS: CPT | Performed by: PHYSICAL THERAPIST

## 2022-01-21 PROCEDURE — 97112 NEUROMUSCULAR REEDUCATION: CPT | Performed by: PHYSICAL THERAPIST

## 2022-01-21 NOTE — PROGRESS NOTES
Daily Note     Today's date: 2022  Patient name: Lurdes Tapia  : 1960  MRN: [id number]  Referring provider: Ayaan Murrell MD  Dx:   Encounter Diagnosis     ICD-10-CM    1  S/P total knee arthroplasty, right  Z96 651                   Subjective: Patient reports significant increase in pain today  He notes he spent a lot of time walking and standing on his knee yesterday without using his RW causing the increased discomfort  Objective: See treatment diary below      Assessment: Tolerated treatment fair  Patient remains moderately restricted in both knee flexion and extension ROM due to pain  Good release of tightness noted after manual PROM by therapist  Luly Vaughn to be challenged with TE requiring full knee extension due to extension ROM limitation and quadriceps weakness but good engagement of the muscle noted with cueing  Demonstrates improvement functionally with ability to ambulate using SPC but with modified mechanics due to pain and ROM deficits  Decreased step length on L and decreased R knee flexion during swing phase noted  Plan: Continue per plan of care        Precautions: R TKA scheduled for 01/10/2022      Manuals          R knee PROM    15' 15'         Patellar mobs   Gr 2 G2         Scar mobs                          Neuro Re-Ed             Quad activation techniques             Quad sets   2x10x5" 2x10x5"         Weight shifts   2x10 D/C         Cueing    5' 5'                                                Ther Ex             Heel slides  2x10 x10" 2x10 x10" [id number]"         Quad sets  2x10 x5"           SAQ  2x10 2x10 2x10         Rec bike   Rocking  5' Rocking 5'         Mini squats   2x10 2x10         HEP review 13'                                      Ther Activity             4in step   10x 20x                      Gait Training             Ambulation     SPC  5'                      Modalities                                          Patient treated by Onur Gould, SPT under direct PT supervision

## 2022-01-24 ENCOUNTER — OFFICE VISIT (OUTPATIENT)
Dept: PHYSICAL THERAPY | Facility: CLINIC | Age: 62
End: 2022-01-24
Payer: COMMERCIAL

## 2022-01-24 DIAGNOSIS — Z96.651 STATUS POST TOTAL RIGHT KNEE REPLACEMENT: ICD-10-CM

## 2022-01-24 DIAGNOSIS — M17.11 PRIMARY OSTEOARTHRITIS OF RIGHT KNEE: Primary | ICD-10-CM

## 2022-01-24 DIAGNOSIS — Z96.651 S/P TOTAL KNEE ARTHROPLASTY, RIGHT: ICD-10-CM

## 2022-01-24 PROCEDURE — 97140 MANUAL THERAPY 1/> REGIONS: CPT

## 2022-01-24 PROCEDURE — 97110 THERAPEUTIC EXERCISES: CPT

## 2022-01-24 NOTE — PROGRESS NOTES
Daily Note     Today's date: 2022  Patient name: Luis A Cabrera  : 1960  MRN: [id number]  Referring provider: Micaela Valero MD  Dx:   Encounter Diagnosis     ICD-10-CM    1  Primary osteoarthritis of right knee  M17 11    2  S/P total knee arthroplasty, right  Z96 651    3  Status post total right knee replacement  Z96 651                   Subjective: Patient reported having continued pain and stiffness in R knee  He has been diligent with his HEP  Scheduled to see ortho tomorrow and have staples removed  Objective: See treatment diary below  Assessment: Pain limiting his ability to tolerate increased overpressure with PROM  Poor quad activation with quad sets  With functional deficits in regards to knee ext his gait remains altered as patient lacks sufficient knee ext for heel strike  Decreased stance time on R LE and weight bearing on R during mini squats  Plan: Continue per plan of care  Progress treatment as tolerated              Precautions: R TKA scheduled for 01/10/2022    Manuals         R knee PROM    15' 15' EH        Patellar mobs   Gr 2 G2 PROM - EH        Scar mobs                          Neuro Re-Ed             Quad activation techniques             Quad sets   2x10x5" 2x10x5" 5" hold, 2x10        Weight shifts   2x10 D/C         Cueing    5' 5'                                                Ther Ex             Heel slides  2x10 x10" 2x10 x10" [id number]" 10" hold, 2x10        Quad sets  2x10 x5"           SAQ  2x10 2x10 2x10 2x10        Rec bike   Rocking  5' Rocking 5' Rocking  5 min        Mini squats   2x10 2x10 2x10        HEP review 15'                                      Ther Activity             4in step   10x 20x x20                     Gait Training             Ambulation     SPC  5' SPC  5 min                     Modalities

## 2022-01-25 ENCOUNTER — HOSPITAL ENCOUNTER (OUTPATIENT)
Dept: RADIOLOGY | Facility: HOSPITAL | Age: 62
Discharge: HOME | End: 2022-01-25
Attending: ORTHOPAEDIC SURGERY
Payer: COMMERCIAL

## 2022-01-25 ENCOUNTER — OFFICE VISIT (OUTPATIENT)
Dept: OBGYN CLINIC | Facility: HOSPITAL | Age: 62
End: 2022-01-25

## 2022-01-25 ENCOUNTER — TELEPHONE (OUTPATIENT)
Dept: OBGYN CLINIC | Facility: HOSPITAL | Age: 62
End: 2022-01-25

## 2022-01-25 ENCOUNTER — OFFICE VISIT (OUTPATIENT)
Dept: INTERNAL MEDICINE CLINIC | Facility: CLINIC | Age: 62
End: 2022-01-25
Payer: COMMERCIAL

## 2022-01-25 VITALS
DIASTOLIC BLOOD PRESSURE: 74 MMHG | HEART RATE: 65 BPM | OXYGEN SATURATION: 98 % | WEIGHT: 185.6 LBS | BODY MASS INDEX: 25.14 KG/M2 | HEIGHT: 72 IN | SYSTOLIC BLOOD PRESSURE: 138 MMHG

## 2022-01-25 VITALS
HEIGHT: 72 IN | DIASTOLIC BLOOD PRESSURE: 87 MMHG | BODY MASS INDEX: 25.77 KG/M2 | HEART RATE: 76 BPM | SYSTOLIC BLOOD PRESSURE: 137 MMHG

## 2022-01-25 DIAGNOSIS — Z96.651 AFTERCARE FOLLOWING RIGHT KNEE JOINT REPLACEMENT SURGERY: Primary | ICD-10-CM

## 2022-01-25 DIAGNOSIS — Z47.1 AFTERCARE FOLLOWING RIGHT KNEE JOINT REPLACEMENT SURGERY: ICD-10-CM

## 2022-01-25 DIAGNOSIS — Z47.1 AFTERCARE FOLLOWING RIGHT KNEE JOINT REPLACEMENT SURGERY: Primary | ICD-10-CM

## 2022-01-25 DIAGNOSIS — Z96.651 AFTERCARE FOLLOWING RIGHT KNEE JOINT REPLACEMENT SURGERY: ICD-10-CM

## 2022-01-25 DIAGNOSIS — Z96.651 STATUS POST RIGHT KNEE REPLACEMENT: Primary | ICD-10-CM

## 2022-01-25 DIAGNOSIS — I10 HYPERTENSION, ESSENTIAL: ICD-10-CM

## 2022-01-25 PROCEDURE — 99214 OFFICE O/P EST MOD 30 MIN: CPT | Performed by: INTERNAL MEDICINE

## 2022-01-25 PROCEDURE — 99024 POSTOP FOLLOW-UP VISIT: CPT | Performed by: ORTHOPAEDIC SURGERY

## 2022-01-25 PROCEDURE — 73560 X-RAY EXAM OF KNEE 1 OR 2: CPT

## 2022-01-25 RX ORDER — OXYCODONE HYDROCHLORIDE 5 MG/1
TABLET ORAL
Qty: 30 TABLET | Refills: 0 | Status: SHIPPED | OUTPATIENT
Start: 2022-01-25 | End: 2022-02-23

## 2022-01-25 NOTE — TELEPHONE ENCOUNTER
Patient calls in stating that he went to the pharmacy to  his oxycodone and it was not there  Please send to [facility] in [facility]  Thank you  Please call patient when sent to pharmacy     [phone]

## 2022-01-25 NOTE — PATIENT INSTRUCTIONS
· Follow up with orthopedic surgery as scheduled for incision and post operative care  · Follow up with PCP for ongoing medical care as previous to surgery  · Continue to monitor home BP  Restart lisinopril if BP goes above 140mmhg    Otherwise follow up with PCP for more instruction  · You are released form care from the surgical optimization medical team however we remain available should you have any questions in the future

## 2022-01-25 NOTE — PROGRESS NOTES
Assessment:   Diagnosis ICD-10-CM Associated Orders   1  Aftercare following right knee joint replacement surgery  Z47 1     Z96 651        Plan:  First postoperative visit 2 weeks status post right total knee arthroplasty  X-rays taken reviewed, physical exam performed  Incision is healed appropriately staples removed successfully  May get incision wet, do not submerge, pat dry  Complete Lovenox as instructed  Refill of oxycodone sent to his pharmacy  Continue physical therapy to maximize recovery  Ice elevate as indicated  Weightbearing activities as tolerated  To do next visit:  Return in about 4 weeks (around 2/22/2022) for re-check  The above stated was discussed in layman's terms and the patient expressed understanding  All questions were answered to the patient's satisfaction  Scribe Attestation    I,:  Elmira Rodrigues am acting as a scribe while in the presence of the attending physician :       I,:  Reena Fragoso MD personally performed the services described in this documentation    as scribed in my presence :             Subjective:   Linsey Serrano is a 60 y o  male who presents 1st postoperative visit 2 weeks status post right total knee arthroplasty  Patient overall is doing rather well  His pain is well controlled with oxycodone  He has been administering his Lovenox for DVT prophylaxis  He is attending physical therapy  He denies any calf or thigh pain  Denies any fevers or chills  He presents today using a single-point cane for ambulatory assistance        Review of systems negative unless otherwise specified in HPI  Review of Systems    Past Medical History:   Diagnosis Date    Fractures     History of transfusion     Hypertension        Past Surgical History:   Procedure Laterality Date    ABDOMINAL SURGERY      KNEE ARTHROSCOPY      KNEE SURGERY      VT TOTAL KNEE ARTHROPLASTY Right 1/10/2022    Procedure: ARTHROPLASTY KNEE TOTAL W ROBOT;  Surgeon: Joseph Elder MD;  [facility];  Service: Orthopedics    ROTATOR CUFF REPAIR      SHOULDER SURGERY      SPLENECTOMY         History reviewed  No pertinent family history  Social History     Occupational History    Not on file   Tobacco Use    Smoking status: Former Smoker    Smokeless tobacco: Never Used   Vaping Use    Vaping Use: Never used   Substance and Sexual Activity    Alcohol use: Yes     Alcohol/week: 2 0 standard drinks     Types: 1 Cans of beer, 1 Shots of liquor per week     Comment: daily after dinner    Drug use: Yes     Types: Marijuana     Comment: CBD gummies    Sexual activity: Not on file         Current Outpatient Medications:     enoxaparin (LOVENOX) 40 mg/0 4 mL, Inject 0 4 mL (40 mg total) under the skin daily for 28 days To start post op, Disp: 11 2 mL, Rfl: 0    lisinopril (ZESTRIL) 10 mg tablet, Take 10 mg by mouth daily (Patient not taking: Reported on 1/25/2022 ), Disp: , Rfl:     Multiple Vitamin (MULTIVITAMIN ADULT PO), Take by mouth, Disp: , Rfl:     Multiple Vitamins-Minerals (PRESERVISION AREDS 2 PO), Take by mouth, Disp: , Rfl:     oxyCODONE (ROXICODONE) 5 immediate release tablet, 1 pill po Q4 Hrs prn, Disp: 30 tablet, Rfl: 0    No Known Allergies         Vitals:    01/25/22 1446   BP: 137/87   Pulse: 76       Objective:                    Right Knee Exam     Range of Motion   Extension: 10   Flexion: 80     Other   Swelling: mild    Comments:    Healed anterior incision with staples in place which were removed successfully today  Steri strips applied  Intact extensor mechanism  Appropriate amount of warmth   Calf and thigh are soft and non tender, no signs or symptoms of DVT or infection            Diagnostics, reviewed and taken today if performed as documented:     The attending physician has personally reviewed the pertinent films in PACS and interpretation is as follows:    Right knee x-rays taken and reviewed in the office today and show: well aligned and positioned total knee prosthesis without signs of loosening, well bonded, superficial skin staples noted  Procedures, if performed today:    Procedures    None performed      Portions of the record may have been created with voice recognition software  Occasional wrong word or "sound a like" substitutions may have occurred due to the inherent limitations of voice recognition software  Read the chart carefully and recognize, using context, where substitutions have occurred

## 2022-01-25 NOTE — PROGRESS NOTES
SOC-INTERNAL MEDICINE POST-OPERATIVE VISIT      NAME: Hetal Kimble  AGE: 60 y o  SEX: male  : 1960     DATE: 2022     Internal Medicine Pre-Operative Evaluation:     Reason for Visit: Post-operative follow up SOC-IM     Surgery: Arthroplasty of right knee  Surgeon: Dr Fermin Hendricks  Primary Care  Provider: Niall Sanon MD    Interval History     Patient seen in medical follow-up 1 week after right knee arthroplasty  Patient doing well with adequate pain control  Getting around well and attending physical therapy  Following postoperative DVT prophylaxis as outlined by his surgeon  Patient eating and drinking without issue denies any constipation issues  Patient is remaining off of his lisinopril as his blood pressure has not really elevated after surgery  Although his blood pressure here today is 138 his home readings have actually been in the 120s and in 1 teens  I have instructed him to continue to monitor his blood pressure at home and reinstitute lisinopril if his blood pressure gets 353 systolic  Otherwise he will follow-up with his primary care physician for more instruction  I am reluctant to have him start his blood pressure currently as given his normal readings at home I feel he may get hypotensive input at risk for fall  At this point I feel patient may follow-up with his primary care physician for ongoing medical care however we remain available for any assistance is necessary      Patient did see his orthopedic surgeon today for wound check and they are happy with his progress           Assessment  Plan:     Status Post Total Joint Arthroplasty  · doing well post right knee  arthroplasty  · Following surgeon recommended DVT prophylaxis  · attending physical therapy sessions and progressing  · current pain scale reported 3/10  · follow up appointment scheduled for incision check and surgical follow up with orthopedics  · medication reconciliation performed  · patient to resume follow up care with their PCP for ongoing care of their pre-surgical co-morbidities    Essential Hypertension   BP has been well controlled post surgery   Continue to monitor home blood pressure readings and restart lisinopril if systolic blood pressure greater than 140   Follow up with PCP for ongoing monitoring and management    Patient's additional co morbidities as below which will have ongoing medical management per the patients PCP:    Patient Active Problem List   Diagnosis    Primary osteoarthritis of left knee    Chronic pain of right knee    Aftercare following right knee joint replacement surgery    BMI 26 0-26 9,adult    H/O splenectomy    Hypertension, essential    Status post right knee replacement         Review of Systems:      No TIA's or unusual headaches, no dysphagia  No prolonged cough  No dyspnea or chest pain on exertion  No abdominal pain, change in bowel habits, black or bloody stools  No urinary tract or BPH symptoms  Pt with mild pain in surgical joint          Current Medications:     Current Outpatient Medications   Medication Instructions    enoxaparin (LOVENOX) 40 mg, Subcutaneous, Daily, To start post op    lisinopril (ZESTRIL) 10 mg, Daily    Multiple Vitamin (MULTIVITAMIN ADULT PO) Oral    Multiple Vitamins-Minerals (PRESERVISION AREDS 2 PO) Oral    oxyCODONE (ROXICODONE) 5 immediate release tablet 1 pill po Q4 Hrs prn          Past History:       Past Medical History:   Diagnosis Date    Fractures     History of transfusion     Hypertension     Past Surgical History:   Procedure Laterality Date    ABDOMINAL SURGERY      KNEE ARTHROSCOPY      KNEE SURGERY      DC TOTAL KNEE ARTHROPLASTY Right 1/10/2022    Procedure: ARTHROPLASTY KNEE TOTAL W ROBOT;  Surgeon: Gaye Fleming MD;  Location: [facility];  Service: Orthopedics    ROTATOR CUFF REPAIR      SHOULDER SURGERY      SPLENECTOMY           Physical Exam:      /74 (BP Location: Left arm, Patient Position: Sitting, Cuff Size: Standard)   Pulse 65   Ht 6' (1 829 m)   Wt 84 2 kg (185 lb 9 6 oz)   SpO2 98%   BMI 25 17 kg/m²     General Appearance: no distress, conversive  HEENT: PERRLA, conjuctiva normal; oropharynx clear; mucous membranes moist;   Neck:  Supple, no lymphadenopathy or thyromegaly  Lungs: CTA, normal respiratory effort, no retractions, expiratory effort normal  CV: regular rate and rhythm , PMI normal   ABD: soft non tender, no masses , no hepatic or splenomegaly  EXT: DP pulses intact, no lymphadenopathy, no edema ;  right KNEE dressing in place  Skin: normal turgor, normal texture, no rash  Psych: affect normal, mood normal  Neuro: AAOx3      Time of visit including pre-visit chart review, visit and post-visit coordination of plan and care , review of pre-surgical lab work, preparation and time spent documenting note in electronic medical record, time spent face-to-face in physical examination answering patient questions: 30 minutes       Dr Juancho Isidro

## 2022-01-28 ENCOUNTER — OFFICE VISIT (OUTPATIENT)
Dept: PHYSICAL THERAPY | Facility: CLINIC | Age: 62
End: 2022-01-28
Payer: COMMERCIAL

## 2022-01-28 DIAGNOSIS — Z96.651 STATUS POST TOTAL RIGHT KNEE REPLACEMENT: ICD-10-CM

## 2022-01-28 DIAGNOSIS — Z96.651 S/P TOTAL KNEE ARTHROPLASTY, RIGHT: Primary | ICD-10-CM

## 2022-01-28 DIAGNOSIS — M17.11 PRIMARY OSTEOARTHRITIS OF RIGHT KNEE: ICD-10-CM

## 2022-01-28 PROCEDURE — 97530 THERAPEUTIC ACTIVITIES: CPT | Performed by: PHYSICAL THERAPIST

## 2022-01-28 PROCEDURE — 97110 THERAPEUTIC EXERCISES: CPT | Performed by: PHYSICAL THERAPIST

## 2022-01-28 PROCEDURE — 97140 MANUAL THERAPY 1/> REGIONS: CPT | Performed by: PHYSICAL THERAPIST

## 2022-01-28 NOTE — PROGRESS NOTES
Daily Note     Today's date: 2022  Patient name: Rashid Nazario  : 1960  MRN: [id number]  Referring provider: Romeo Preston MD  Dx:   Encounter Diagnosis     ICD-10-CM    1  S/P total knee arthroplasty, right  Z96 651    2  Primary osteoarthritis of right knee  M17 11    3  Status post total right knee replacement  Z96 651                   Subjective: Patient reports continued pain and soreness with function  Patient notes difficulty achieving knee extension ROM  Objective: See treatment diary below    18-98°    Assessment: Significant knee PROM deficits today, in comparison to last tx session  ROM remains limited by pain into flexion and extension  Patient was educated to increase frequency and hold times with stretching exercises at home  Quad activation remains poor-moderate but slowly improving  Plan: Continue per plan of care        Precautions: R TKA scheduled for 01/10/2022    Manuals        R knee PROM    15' 15' [facility] 15'       Patellar mobs   Gr 2 G2 PROM - EH G4       Scar mobs                          Neuro Re-Ed             Quad activation techniques             Quad sets   2x10x5" 2x10x5" 5" hold, 2x10 2x10 x5"       Weight shifts   2x10 D/C         Cueing    5' 5'                                                Ther Ex             Heel slides  2x10 x10" 2x10 x10" [id number]" 10" hold, 2x10 2x10 x10"       Quad sets  2x10 x5"           SAQ  2x10 2x10 2x10 2x10 2x10 x3"       Rec bike   Rocking  5' Rocking 5' Rocking  5 min 5' rocking       Mini squats   2x10 2x10 2x10        HEP review 15'            TKE w/ TB      GTB  2x10 x5"                    Ther Activity             4in step   10x 20x x20 x20       Gait training      5' [facility]       Gait Training             Ambulation     SPC  5' SPC  5 min SPC  5'                    Modalities

## 2022-02-01 ENCOUNTER — OFFICE VISIT (OUTPATIENT)
Dept: PHYSICAL THERAPY | Facility: CLINIC | Age: 62
End: 2022-02-01
Payer: COMMERCIAL

## 2022-02-01 DIAGNOSIS — Z96.651 S/P TOTAL KNEE ARTHROPLASTY, RIGHT: Primary | ICD-10-CM

## 2022-02-01 DIAGNOSIS — M17.11 PRIMARY OSTEOARTHRITIS OF RIGHT KNEE: ICD-10-CM

## 2022-02-01 DIAGNOSIS — Z96.651 STATUS POST TOTAL RIGHT KNEE REPLACEMENT: ICD-10-CM

## 2022-02-01 PROCEDURE — 97140 MANUAL THERAPY 1/> REGIONS: CPT | Performed by: PHYSICAL THERAPIST

## 2022-02-01 PROCEDURE — 97110 THERAPEUTIC EXERCISES: CPT | Performed by: PHYSICAL THERAPIST

## 2022-02-01 PROCEDURE — 97530 THERAPEUTIC ACTIVITIES: CPT | Performed by: PHYSICAL THERAPIST

## 2022-02-01 NOTE — PROGRESS NOTES
Daily Note     Today's date: 2022  Patient name: Mindy Dobbs  : 1960  MRN: [id number]  Referring provider: Lizabeth Gomez MD  Dx:   Encounter Diagnosis     ICD-10-CM    1  S/P total knee arthroplasty, right  Z96 651    2  Primary osteoarthritis of right knee  M17 11    3  Status post total right knee replacement  Z96 651                   Subjective: Patient reports improving motion, no longer using a cane and is navigating stairs at home  Sleep remains challenging  Objective: See treatment diary below    R knee AROM: °    Assessment: Patient demonstrating continued right knee improvements but similar to prior sessions and remains limited by pain  Again stressed importance of improving knee extension AROM via increased stretching compliance and LLPS  Improving stair navigation including concentric and eccentric phases on 6" steps  Plan: Continue per plan of care        Precautions: R TKA scheduled for 01/10/2022    Manuals       R knee PROM    15' 15' 140[address] [facility] 15' 15'      Patellar mobs   Gr 2 G2 PROM - EH G4 G4      Scar mobs                          Neuro Re-Ed             Quad activation techniques             Quad sets   2x10x5" 2x10x5" 5" hold, 2x10 2x10 x5"       Weight shifts   2x10 D/C         Cueing    5' 5'                                                Ther Ex             Heel slides  2x10 x10" 2x10 x10" [id number]" 10" hold, 2x10 2x10 x10" 10 x10"      Quad sets  2x10 x5"     10 x5"      SAQ  2x10 2x10 2x10 2x10 2x10 x3" 1#  2x10 x3"      Rec bike   Rocking  5' Rocking 5' Rocking  5 min 5' rocking 5' full cycle      Mini squats   2x10 2x10 2x10        Leg Press - single leg       35#  3x10      TKE w/ TB      GTB  2x10 x5" GTB  2x10 x5"                   Ther Activity             6in step   10x 20x x20 x20 2x10      Gait training      5' SPC 5' no cane      Gait Training             Ambulation     SPC  5' SPC  5 min SPC  5' Modalities

## 2022-02-04 ENCOUNTER — OFFICE VISIT (OUTPATIENT)
Dept: PHYSICAL THERAPY | Facility: CLINIC | Age: 62
End: 2022-02-04
Payer: COMMERCIAL

## 2022-02-04 DIAGNOSIS — M17.11 PRIMARY OSTEOARTHRITIS OF RIGHT KNEE: ICD-10-CM

## 2022-02-04 DIAGNOSIS — Z96.651 STATUS POST TOTAL RIGHT KNEE REPLACEMENT: ICD-10-CM

## 2022-02-04 DIAGNOSIS — Z96.651 S/P TOTAL KNEE ARTHROPLASTY, RIGHT: Primary | ICD-10-CM

## 2022-02-04 PROCEDURE — 97110 THERAPEUTIC EXERCISES: CPT

## 2022-02-04 PROCEDURE — 97140 MANUAL THERAPY 1/> REGIONS: CPT

## 2022-02-04 PROCEDURE — 97530 THERAPEUTIC ACTIVITIES: CPT

## 2022-02-04 NOTE — PROGRESS NOTES
Daily Note     Today's date: 2022  Patient name: Sam Linda  : 1960  MRN: [id number]  Referring provider: Bonnie Wan MD  Dx:   Encounter Diagnosis     ICD-10-CM    1  S/P total knee arthroplasty, right  Z96 651    2  Primary osteoarthritis of right knee  M17 11    3  Status post total right knee replacement  Z96 651                   Subjective: Patient reported remaining diligent with HEP and working on his ROM  Still most limited by pain  Has been riding recumbent bike and walking on treadmill at the gym  Objective: See treatment diary below  Assessment: Showing improvement in both flex and ext plane of motion, limited from achieving greater overpressure secondary to pain  Improved stair navigation with better form and control demonstrated  Gait deviations persist due to lack of functional TKE and difficulty achieving heel strike  Plan: Continue per plan of care             Precautions: R TKA scheduled for 01/10/2022    Manuals      R knee PROM    15' 15' 1404 [facility] 15' 15' EH     Patellar mobs   Gr 2 G2 PROM - EH G4 G4 PROM - EH     Scar mobs                          Neuro Re-Ed             Quad activation techniques             Quad sets   2x10x5" 2x10x5" 5" hold, 2x10 2x10 x5"       Weight shifts   2x10 D/C         Cueing    5' 5'                                                Ther Ex             Heel slides  2x10 x10" 2x10 x10" [id number]" 10" hold, 2x10 2x10 x10" 10 x10" 10"x  10     Quad sets  2x10 x5"     10 x5" 5"x10     SAQ  2x10 2x10 2x10 2x10 2x10 x3" 1#  2x10 x3" 1#  3" hold, 2x10     Rec bike   Rocking  5' Rocking 5' Rocking  5 min 5' rocking 5' full cycle Full  5 min     Mini squats   2x10 2x10 2x10        Leg Press - single leg       35#  3x10 35#  2x10,  45#  2x10     TKE w/ TB      GTB  2x10 x5" GTB  2x10 x5" GTB  5" hold, 2x10                  Ther Activity             6in step   10x 20x x20 x20 2x10 2x10     Gait training      5' SPC 5' no cane 5 min no cane                  Gait Training             Ambulation     SPC  5' SPC  5 min SPC  5'                    Modalities

## 2022-02-08 ENCOUNTER — OFFICE VISIT (OUTPATIENT)
Dept: PHYSICAL THERAPY | Facility: CLINIC | Age: 62
End: 2022-02-08
Payer: COMMERCIAL

## 2022-02-08 DIAGNOSIS — Z96.651 S/P TOTAL KNEE ARTHROPLASTY, RIGHT: Primary | ICD-10-CM

## 2022-02-08 DIAGNOSIS — M17.11 PRIMARY OSTEOARTHRITIS OF RIGHT KNEE: ICD-10-CM

## 2022-02-08 PROCEDURE — 97140 MANUAL THERAPY 1/> REGIONS: CPT | Performed by: PHYSICAL THERAPIST

## 2022-02-08 PROCEDURE — 97110 THERAPEUTIC EXERCISES: CPT | Performed by: PHYSICAL THERAPIST

## 2022-02-08 PROCEDURE — 97530 THERAPEUTIC ACTIVITIES: CPT | Performed by: PHYSICAL THERAPIST

## 2022-02-08 NOTE — PROGRESS NOTES
Daily Note     Today's date: 2022  Patient name: Handy Lemon  : 1960  MRN: [id number]  Referring provider: Jovanna León MD  Dx:   Encounter Diagnosis     ICD-10-CM    1  S/P total knee arthroplasty, right  Z96 651    2  Primary osteoarthritis of right knee  M17 11                   Subjective: Patient reports continued HEP compliance and notes that his sleeping is improving and remains compliant with his extension stretching  Objective: See treatment diary below    °    Assessment: Patient demonstrates right knee PROM that continues to improve  Knee flexion remains WFL and extension remains limited by pain as noted above  Educated patient to perform LLPS in prone with gravity assist for TKE and adjusted gait mechanics  Improved quad activation noted with overpressure into knee extension  Plan: Continue per plan of care        Precautions: R TKA scheduled for 01/10/2022    Manuals     R knee PROM    15' 15' [address] 15' 15' [address] [facility] [address] 15'    Patellar mobs   Gr 2 G2 PROM - EH G4 G4 PROM - EH G4    Scar mobs                          Neuro Re-Ed             Quad activation techniques             Quad sets   2x10x5" 2x10x5" 5" hold, 2x10 2x10 x5"       Weight shifts   2x10 D/C         Cueing    5' 5'                                                Ther Ex             Heel slides  2x10 x10" 2x10 x10" [id number]" 10" hold, 2x10 2x10 x10" 10 x10" 10"x  10 10 x10"    Quad sets  2x10 x5"     10 x5" 5"x10 10 x5"    SAQ  2x10 2x10 2x10 2x10 2x10 x3" 1#  2x10 x3" 1#  3" hold, 2x10 2#  2x10 x3"    Rec bike   Rocking  5' Rocking 5' Rocking  5 min 5' rocking 5' full cycle Full  5 min Full 5'    LAQ         2#  3x10    Mini squats   2x10 2x10 2x10        Leg Press - single leg       35#  3x10 35#  2x10,  45#  2x10 45#  3x10    TKE w/ TB      GTB  2x10 x5" GTB  2x10 x5" GTB  5" hold, 2x10 15#  2x10 x5"    Prone knee extension LLPS             Ther Activity 6in step   10x 20x x20 x20 2x10 2x10 2x10    Gait training      5' SPC 5' no cane 5 min no cane 5' no cane                 Gait Training             Ambulation     SPC  5' SPC  5 min SPC  5'                    Modalities

## 2022-02-11 ENCOUNTER — OFFICE VISIT (OUTPATIENT)
Dept: PHYSICAL THERAPY | Facility: CLINIC | Age: 62
End: 2022-02-11
Payer: COMMERCIAL

## 2022-02-11 DIAGNOSIS — Z96.651 S/P TOTAL KNEE ARTHROPLASTY, RIGHT: ICD-10-CM

## 2022-02-11 DIAGNOSIS — Z96.651 STATUS POST TOTAL RIGHT KNEE REPLACEMENT: ICD-10-CM

## 2022-02-11 DIAGNOSIS — M17.11 PRIMARY OSTEOARTHRITIS OF RIGHT KNEE: Primary | ICD-10-CM

## 2022-02-11 PROCEDURE — 97530 THERAPEUTIC ACTIVITIES: CPT

## 2022-02-11 PROCEDURE — 97140 MANUAL THERAPY 1/> REGIONS: CPT

## 2022-02-11 PROCEDURE — 97110 THERAPEUTIC EXERCISES: CPT

## 2022-02-11 NOTE — PROGRESS NOTES
Daily Note     Today's date: 2022  Patient name: Carine Enriquez  : 1960  MRN: [id number]  Referring provider: Candy Hdz MD  Dx:   Encounter Diagnosis     ICD-10-CM    1  Primary osteoarthritis of right knee  M17 11    2  S/P total knee arthroplasty, right  Z96 651    3  Status post total right knee replacement  Z96 651                   Subjective: Patient reports continued compliance with HEP and his extension stretching  Objective: See treatment diary below      Assessment: Patient demonstrates right knee PROM that continues to improve  Knee flexion remains WFL however extension remains limited by pain  From session to session he has shown greater improvement in extension  Will continue to benefit from skilled physical therapy to help increase ROM and decrease pain in an effort to improve function  Plan: Continue per plan of care        Precautions: R TKA scheduled for 01/10/2022    Manuals 12/28 1/14 1/18 1/21 1/24 1/28 2/1 2/4 2/8 2/10   R knee PROM    15' 15' [address] [facility] [address] 15' 15' [address] [facility] [address] 15' HI   Patellar mobs   Gr 2 G2 PROM - EH G4 G4 PROM - EH G4 PROM- HI   Scar mobs                          Neuro Re-Ed             Quad activation techniques             Quad sets   2x10x5" 2x10x5" 5" hold, 2x10 2x10 x5"       Weight shifts   2x10 D/C         Cueing    5' 5'                                                Ther Ex             Heel slides  2x10 x10" 2x10 x10" [id number]" 10" hold, 2x10 2x10 x10" 10 x10" 10"x  10 10 x10" 10 x10"   Quad sets  2x10 x5"     10 x5" 5"x10 10 x5" 10 x5"   SAQ  2x10 2x10 2x10 2x10 2x10 x3" 1#  2x10 x3" 1#  3" hold, 2x10 2#  2x10 x3"    Rec bike   Rocking  5' Rocking 5' Rocking  5 min 5' rocking 5' full cycle Full  5 min Full 5' Full 5'   LAQ         2#  3x10 2# 3x10   Mini squats   2x10 2x10 2x10        Leg Press St 9- single leg       35#  3x10 35#  2x10,  45#  2x10 45#  3x10 45# 3x10   TKE w/ TB      GTB  2x10 x5" GTB  2x10 x5" GTB  5" hold, 2x10 15#  2x10 x5" 15# 2x10 x5"   Prone knee extension LLPS             Ther Activity             6in step   10x 20x x20 x20 2x10 2x10 2x10 2x10   Gait training      5' SPC 5' no cane 5 min no cane 5' no cane 5" no cane                Gait Training             Ambulation     SPC  5' SPC  5 min SPC  5'                    Modalities

## 2022-02-15 ENCOUNTER — OFFICE VISIT (OUTPATIENT)
Dept: PHYSICAL THERAPY | Facility: CLINIC | Age: 62
End: 2022-02-15
Payer: COMMERCIAL

## 2022-02-15 DIAGNOSIS — M17.11 PRIMARY OSTEOARTHRITIS OF RIGHT KNEE: Primary | ICD-10-CM

## 2022-02-15 DIAGNOSIS — Z96.651 S/P TOTAL KNEE ARTHROPLASTY, RIGHT: ICD-10-CM

## 2022-02-15 PROCEDURE — 97530 THERAPEUTIC ACTIVITIES: CPT | Performed by: PHYSICAL THERAPIST

## 2022-02-15 PROCEDURE — 97110 THERAPEUTIC EXERCISES: CPT | Performed by: PHYSICAL THERAPIST

## 2022-02-15 PROCEDURE — 97140 MANUAL THERAPY 1/> REGIONS: CPT | Performed by: PHYSICAL THERAPIST

## 2022-02-15 NOTE — PROGRESS NOTES
Daily Note     Today's date: 2/15/2022  Patient name: Carmen Turner  : 1960  MRN: [id number]  Referring provider: Fariha Clarke MD  Dx:   Encounter Diagnosis     ICD-10-CM    1  Primary osteoarthritis of right knee  M17 11    2  S/P total knee arthroplasty, right  Z96 651                   Subjective: Patient reports continued HEP compliance and states that overall his ROM is improving  Patient notes increased tightness and pain from a slip this weekend on ice but did not fall to the ground  Objective: See treatment diary below      Assessment: Patient demonstrating continued right knee PROM improvements; but most limited into knee extension  Early in treatment, poor quad activation present but this significant improved with cueing  Time spent improving terminal knee extension in stance during the gait cycle  Proper concentric and eccentric control noted with stair navigation  Plan: Continue per plan of care        Precautions: R TKA scheduled for 01/10/2022    Manuals 2/15         2/10   R knee PROM  20'         HI   Patellar mobs 5'         PROM- HI   Scar mobs                          Neuro Re-Ed             Quad activation techniques             Quad sets             Weight shifts             Cueing                                                     Ther Ex             Heel slides 10 x10"         10 x10"   Quad sets 10 x5"         10 x5"   SAQ             Rec bike Full 5'         Full 5'   LAQ 5#  3x10         2# 3x10   Mini squats             Leg Press St 9- single leg 45#  3x10         45# 3x10   TKE w/ van 15#  2x10 x5"         15# 2x10 x5"   Prone knee extension LLPS 5# ankle weight  5'            Ther Activity             6in step 2x10         2x10   Gait training 5' no cane         5" no cane                Gait Training             Ambulation                           Modalities

## 2022-02-18 ENCOUNTER — OFFICE VISIT (OUTPATIENT)
Dept: PHYSICAL THERAPY | Facility: CLINIC | Age: 62
End: 2022-02-18
Payer: COMMERCIAL

## 2022-02-18 DIAGNOSIS — M17.11 PRIMARY OSTEOARTHRITIS OF RIGHT KNEE: Primary | ICD-10-CM

## 2022-02-18 DIAGNOSIS — Z96.651 S/P TOTAL KNEE ARTHROPLASTY, RIGHT: ICD-10-CM

## 2022-02-18 DIAGNOSIS — Z96.651 STATUS POST TOTAL RIGHT KNEE REPLACEMENT: ICD-10-CM

## 2022-02-18 PROCEDURE — 97140 MANUAL THERAPY 1/> REGIONS: CPT | Performed by: PHYSICAL THERAPIST

## 2022-02-18 PROCEDURE — 97110 THERAPEUTIC EXERCISES: CPT | Performed by: PHYSICAL THERAPIST

## 2022-02-18 PROCEDURE — 97530 THERAPEUTIC ACTIVITIES: CPT | Performed by: PHYSICAL THERAPIST

## 2022-02-18 NOTE — PROGRESS NOTES
Daily Note     Today's date: 2022  Patient name: Micaela Lama  : 1960  MRN: [id number]  Referring provider: Eusebia Roe MD  Dx:   Encounter Diagnosis     ICD-10-CM    1  Primary osteoarthritis of right knee  M17 11    2  S/P total knee arthroplasty, right  Z96 651    3  Status post total right knee replacement  Z96 651                   Subjective: Patient reports that his knee feels good today  Thinks he is getting better motion  He has been compliant with gastroc stretches at home, and he has noticed a positive difference with his walking  Patient notes that he was able to get down onto his knees to wash the kitchen floor with only mild discomfort  Objective: See treatment diary below  Progressed ther-ex as indicated below  Assessment: Noted moderate muscle guarding and apprehension with knee extension manual therapy  His extension PROM improved following treatment  Patient was able to tolerate progression of resistance exercises without an increase in pain  They demonstrated muscular fatigue as expected with progression  Continue to progress as tolerated  Patient will continue to benefit from skilled PT in order to address impairments and improve function  Plan: Continue per plan of care  Progress treatment as tolerated         Precautions: R TKA scheduled for 01/10/2022    Manuals 2/15 2/18        2/10   R knee PROM  20' JF        HI   Patellar mobs 5' JF        PROM- HI   Scar mobs                          Neuro Re-Ed             Quad activation techniques             Quad sets             Weight shifts             Cueing                                                     Ther Ex             Heel slides 10 x10" 10x10"        10 x10"   Quad sets 10 x5" 10x5"        10 x5"   SAQ             Rec bike Full 5' Full 5'        Full 5'   LAQ 5#  3x10 5#  3x10        2# 3x10   Mini squats             Leg Press St 9- single leg 45#  3x10 55#  3x10        45# 3x10   TKE w/ van 15#  2x10 x5" 16#  3x10   5" hold        15# 2x10 x5"   Prone knee extension LLPS 5# ankle weight  5' 5#  Ankle weight   5'           Ther Activity             6in step 2x10 2x10        2x10   Gait training 5' no cane 5' no cane        5" no cane                Gait Training             Ambulation                           Modalities

## 2022-02-22 ENCOUNTER — OFFICE VISIT (OUTPATIENT)
Dept: PHYSICAL THERAPY | Facility: CLINIC | Age: 62
End: 2022-02-22
Payer: COMMERCIAL

## 2022-02-22 DIAGNOSIS — M17.11 PRIMARY OSTEOARTHRITIS OF RIGHT KNEE: Primary | ICD-10-CM

## 2022-02-22 DIAGNOSIS — Z96.651 STATUS POST TOTAL RIGHT KNEE REPLACEMENT: ICD-10-CM

## 2022-02-22 DIAGNOSIS — Z96.651 S/P TOTAL KNEE ARTHROPLASTY, RIGHT: ICD-10-CM

## 2022-02-22 PROCEDURE — 97530 THERAPEUTIC ACTIVITIES: CPT | Performed by: PHYSICAL THERAPIST

## 2022-02-22 PROCEDURE — 97140 MANUAL THERAPY 1/> REGIONS: CPT | Performed by: PHYSICAL THERAPIST

## 2022-02-22 PROCEDURE — 97110 THERAPEUTIC EXERCISES: CPT | Performed by: PHYSICAL THERAPIST

## 2022-02-22 NOTE — PROGRESS NOTES
Daily Note     Today's date: 2022  Patient name: Marcello Nevarez  : 1960  MRN: [id number]  Referring provider: Lucia Mendoza MD  Dx:   Encounter Diagnosis     ICD-10-CM    1  Primary osteoarthritis of right knee  M17 11    2  S/P total knee arthroplasty, right  Z96 651    3  Status post total right knee replacement  Z96 651                   Subjective: Patient reports HEP compliance but some soreness noted secondary to ambulating 2 miles yesterday  Objective: See treatment diary below      Assessment: Patient demonstrating continued improvement in knee ROM with flexion WNL and extension only 2-3° deficit passively  Quad activation improved with tactile and verbal cueing to limit quad lag  Overall gait and stair quality improving without using an AD or stair rail  Plan: Continue per plan of care        Precautions: R TKA scheduled for 01/10/2022    Manuals 2/15 2/18 2/22       2/10   R knee PROM  20' JF 20'       HI   Patellar mobs 5' JF 5'       PROM- HI   Scar mobs                          Neuro Re-Ed             Quad activation techniques             Quad sets             Weight shifts             Cueing                                                     Ther Ex             Heel slides 10 x10" 10x10" 10 x10"       10 x10"   Quad sets 10 x5" 10x5" 10 x5"       10 x5"   SAQ             Rec bike Full 5' Full 5' Full 5'       Full 5'   LAQ 5#  3x10 5#  3x10 10#  3x10       2# 3x10   Mini squats             Leg Press St 9- single leg 45#  3x10 55#  3x10 55#  3x10       45# 3x10   TKE w/ van 15#  2x10 x5" 16#  3x10   5" hold 16#  3x10 x5" hold       15# 2x10 x5"   Prone knee extension LLPS 5# ankle weight  5' 5#  Ankle weight   5' 5# ankle weight 5'          Ther Activity             6in step 2x10 2x10 2x10 no rail       2x10   Gait training 5' no cane 5' no cane 5' no cane       5" no cane                Gait Training             Ambulation                           Modalities

## 2022-02-23 ENCOUNTER — OFFICE VISIT (OUTPATIENT)
Dept: OBGYN CLINIC | Facility: HOSPITAL | Age: 62
End: 2022-02-23

## 2022-02-23 VITALS
WEIGHT: 183 LBS | HEART RATE: 71 BPM | DIASTOLIC BLOOD PRESSURE: 86 MMHG | SYSTOLIC BLOOD PRESSURE: 143 MMHG | HEIGHT: 72 IN | BODY MASS INDEX: 24.79 KG/M2

## 2022-02-23 DIAGNOSIS — Z96.651 S/P TKR (TOTAL KNEE REPLACEMENT), RIGHT: Primary | ICD-10-CM

## 2022-02-23 PROCEDURE — 99024 POSTOP FOLLOW-UP VISIT: CPT | Performed by: ORTHOPAEDIC SURGERY

## 2022-02-23 RX ORDER — CEPHALEXIN 500 MG/1
CAPSULE ORAL
Qty: 12 CAPSULE | Refills: 3 | Status: SHIPPED | OUTPATIENT
Start: 2022-02-23 | End: 2022-02-28

## 2022-02-23 NOTE — PROGRESS NOTES
Subjective;    6 week follow-up right total knee replacement  Kelvin Galloway has concluded DVT prophylaxis  He is doing physical therapy  Is accompanied by his wife for today's appointment    Past Medical History:   Diagnosis Date    Fractures     History of transfusion     Hypertension        Past Surgical History:   Procedure Laterality Date    ABDOMINAL SURGERY      KNEE ARTHROSCOPY      KNEE SURGERY      AK TOTAL KNEE ARTHROPLASTY Right 1/10/2022    Procedure: ARTHROPLASTY KNEE TOTAL W ROBOT;  Surgeon: Joe Mullen MD;  Location: [facility];  Service: Orthopedics    ROTATOR CUFF REPAIR      SHOULDER SURGERY      SPLENECTOMY         History reviewed  No pertinent family history  Social History     Tobacco Use    Smoking status: Former Smoker    Smokeless tobacco: Never Used   Vaping Use    Vaping Use: Never used   Substance Use Topics    Alcohol use: Yes     Alcohol/week: 2 0 standard drinks     Types: 1 Cans of beer, 1 Shots of liquor per week     Comment: daily after dinner    Drug use: Yes     Types: Marijuana     Comment: CBD gummies     Exam;    Comfortable male in no acute distress  Healed anterior knee incision  1 small thread of Vicryl removed at this time with sterile implements  Extension lag of 5-8 degrees  Flexion greater than 100°  No calf pain  Negative Homans test  Audible nonpainful metal on plastic sound    Impression;    6 week follow-up right total knee replacement  Plan; He was given a joint recipient identification card    He was provided dental antibiotic prophylaxis medication  The suture was removed without difficulty  He will follow-up in 6 additional weeks x-rays right knee series would be appropriate at that visit    His experience was supervised by and plan formulated by the attending surgeon it was my privilege to assist him in the delivery this gentleman's care

## 2022-02-25 ENCOUNTER — OFFICE VISIT (OUTPATIENT)
Dept: PHYSICAL THERAPY | Facility: CLINIC | Age: 62
End: 2022-02-25
Payer: COMMERCIAL

## 2022-02-25 DIAGNOSIS — Z96.651 S/P TOTAL KNEE ARTHROPLASTY, RIGHT: ICD-10-CM

## 2022-02-25 DIAGNOSIS — M17.11 PRIMARY OSTEOARTHRITIS OF RIGHT KNEE: Primary | ICD-10-CM

## 2022-02-25 DIAGNOSIS — Z96.651 STATUS POST TOTAL RIGHT KNEE REPLACEMENT: ICD-10-CM

## 2022-02-25 PROCEDURE — 97140 MANUAL THERAPY 1/> REGIONS: CPT | Performed by: PHYSICAL THERAPIST

## 2022-02-25 PROCEDURE — 97530 THERAPEUTIC ACTIVITIES: CPT | Performed by: PHYSICAL THERAPIST

## 2022-02-25 PROCEDURE — 97110 THERAPEUTIC EXERCISES: CPT | Performed by: PHYSICAL THERAPIST

## 2022-02-25 NOTE — PROGRESS NOTES
Daily Note     Today's date: 2022  Patient name: Mindy Dobbs  : 1960  MRN: [id number]  Referring provider: Lizbaeth Gomez MD  Dx:   Encounter Diagnosis     ICD-10-CM    1  Primary osteoarthritis of right knee  M17 11    2  S/P total knee arthroplasty, right  Z96 651    3  Status post total right knee replacement  Z96 651                   Subjective: Patient reports he has his 6 week f/u with surgeon the other day and reports all went well, feels like his knee is feeling pretty good overall       Objective: See treatment diary below      Assessment: Patient continues to tolerate therapy sessions well; ongoing emphasis of knee extension and flexion AROM through use of functional exercises to progress  Still note extensor lag and end range knee extension stiffness with limited tolerance to passive OP for knee extension  Did note improvements with R single leg press with closer to full extension  Able to slightly progress resistance with leg press and TKE at [facility] today in which he was challenged by but able to tolerate with emphasis of extension holds for 5'' for all exercises  Cont skilled PT  Plan: Continue per plan of care        Precautions: R TKA scheduled for 01/10/2022    Manuals 2/15 2/18 2/22 2/25      2/10   R knee PROM  20' JF 20' WJB      HI   Patellar mobs 5' JF 5' WJB      PROM- HI   Scar mobs                          Neuro Re-Ed             Quad activation techniques             Quad sets             Weight shifts             Cueing                                                     Ther Ex             Heel slides 10 x10" 10x10" 10 x10" 10x10''      10 x10"   Quad sets 10 x5" 10x5" 10 x5" 10x5''      10 x5"   SAQ             Rec bike Full 5' Full 5' Full 5' Full 5'      Full 5'   LAQ 5#  3x10 5#  3x10 10#  3x10 10#  3x10      2# 3x10   Mini squats             Leg Press St 9- single leg 45#  3x10 55#  3x10 55#  3x10 65# 3x10      45# 3x10   TKE w/ van 15#  2x10 x5" 16#  3x10   5" hold 16#  3x10 x5" hold 18# 3x10 5'' hold      15# 2x10 x5"   Prone knee extension LLPS 5# ankle weight  5' 5#  Ankle weight   5' 5# ankle weight 5' 5# ankle weight 5'         Ther Activity             6in step 2x10 2x10 2x10 no rail 2x10 no rail       2x10   Gait training 5' no cane 5' no cane 5' no cane 5'   no cane      5" no cane                Gait Training             Ambulation                           Modalities

## 2022-02-28 ENCOUNTER — OFFICE VISIT (OUTPATIENT)
Dept: PHYSICAL THERAPY | Facility: CLINIC | Age: 62
End: 2022-02-28
Payer: COMMERCIAL

## 2022-02-28 DIAGNOSIS — Z96.651 S/P TOTAL KNEE ARTHROPLASTY, RIGHT: ICD-10-CM

## 2022-02-28 DIAGNOSIS — Z96.651 STATUS POST TOTAL RIGHT KNEE REPLACEMENT: ICD-10-CM

## 2022-02-28 DIAGNOSIS — M17.11 PRIMARY OSTEOARTHRITIS OF RIGHT KNEE: Primary | ICD-10-CM

## 2022-02-28 PROCEDURE — 97110 THERAPEUTIC EXERCISES: CPT | Performed by: PHYSICAL THERAPIST

## 2022-02-28 PROCEDURE — 97140 MANUAL THERAPY 1/> REGIONS: CPT | Performed by: PHYSICAL THERAPIST

## 2022-02-28 PROCEDURE — 97530 THERAPEUTIC ACTIVITIES: CPT | Performed by: PHYSICAL THERAPIST

## 2022-02-28 NOTE — PROGRESS NOTES
Daily Note     Today's date: 2022  Patient name: Thierno Rubalcava  : 1960  MRN: [id number]  Referring provider: Ab Dave MD  Dx:   Encounter Diagnosis     ICD-10-CM    1  Primary osteoarthritis of right knee  M17 11    2  S/P total knee arthroplasty, right  Z96 651    3  Status post total right knee replacement  Z96 651                   Subjective: Patient reports HEP compliance addressing knee extension PROM deficit  Patient most challenged with sleep due to discomfort and stair navigation, but both are improving  Objective: See treatment diary below      Assessment: End range knee extension deficit persists (~5°) via PROM  No quad lag or AROM deficit within this range as AROM is within PROM range  Continuing to ramp up strengthening tasks as below and improved eccentric control on steps noted  Plan: Continue per plan of care        Precautions: R TKA scheduled for 01/10/2022    Manuals 2/15 2/18 2/22 2/25 2/28     2/10   R knee PROM  20' JF 20' WJB 20'     HI   Patellar mobs 5' JF 5' WJB 5'     PROM- HI   Scar mobs                          Neuro Re-Ed             Quad activation techniques             Quad sets             Weight shifts             Cueing                                                     Ther Ex             Heel slides 10 x10" 10x10" 10 x10" 10x10'' 10 x10"     10 x10"   Quad sets 10 x5" 10x5" 10 x5" 10x5'' 10 x5"     10 x5"   SAQ             Rec bike Full 5' Full 5' Full 5' Full 5' Full 5'     Full 5'   LAQ 5#  3x10 5#  3x10 10#  3x10 10#  3x10 15#  3x10     2# 3x10   Mini squats             Leg Press St 9- single leg 45#  3x10 55#  3x10 55#  3x10 65# 3x10 65#  3x10     45# 3x10   TKE w/ van 15#  2x10 x5" 16#  3x10   5" hold 16#  3x10 x5" hold 18# 3x10 5'' hold 18#  3x10 5" hold     15# 2x10 x5"   Prone knee extension LLPS 5# ankle weight  5' 5#  Ankle weight   5' 5# ankle weight 5' 5# ankle weight 5' 5# ankle weight 5'        Ther Activity 6in step 2x10 2x10 2x10 no rail 2x10 no rail  8"     2x10   Gait training 5' no cane 5' no cane 5' no cane 5'   no cane 5' no cane     5" no cane                Gait Training             Ambulation                           Modalities

## 2022-03-04 ENCOUNTER — OFFICE VISIT (OUTPATIENT)
Dept: PHYSICAL THERAPY | Facility: CLINIC | Age: 62
End: 2022-03-04
Payer: COMMERCIAL

## 2022-03-04 DIAGNOSIS — Z96.651 STATUS POST TOTAL RIGHT KNEE REPLACEMENT: ICD-10-CM

## 2022-03-04 DIAGNOSIS — M17.11 PRIMARY OSTEOARTHRITIS OF RIGHT KNEE: Primary | ICD-10-CM

## 2022-03-04 DIAGNOSIS — Z96.651 S/P TOTAL KNEE ARTHROPLASTY, RIGHT: ICD-10-CM

## 2022-03-04 PROCEDURE — 97530 THERAPEUTIC ACTIVITIES: CPT | Performed by: PHYSICAL THERAPIST

## 2022-03-04 PROCEDURE — 97110 THERAPEUTIC EXERCISES: CPT | Performed by: PHYSICAL THERAPIST

## 2022-03-04 PROCEDURE — 97140 MANUAL THERAPY 1/> REGIONS: CPT | Performed by: PHYSICAL THERAPIST

## 2022-03-04 NOTE — PROGRESS NOTES
Daily Note     Today's date: 3/4/2022  Patient name: Carmen Turner  : 1960  MRN: [id number]  Referring provider: Fariha Clarke MD  Dx:   Encounter Diagnosis     ICD-10-CM    1  Primary osteoarthritis of right knee  M17 11    2  S/P total knee arthroplasty, right  Z96 651    3  Status post total right knee replacement  Z96 651                   Subjective: Patient reports continued HEP compliance and states that his pain continues to feel better and was able to walk for 4 miles without pain  Objective: See treatment diary below      Assessment: Patient demonstrating significant improvement in knee PROM today (2-120°) of knee PROM  Quad activation and strength also improving at end range knee PROM  Greater eccentric control and concentric push off power noted with step ups  Plan: Continue per plan of care        Precautions: R TKA scheduled for 01/10/2022    Manuals 2/15 2/18 2/22 2/25 2/28 3/4    2/10   R knee PROM  20' JF 20' WJB 20' 20'    HI   Patellar mobs 5' JF 5' WJB 5' 5'    PROM- HI   Scar mobs                          Neuro Re-Ed             Quad activation techniques             Quad sets             Weight shifts             Cueing                                                     Ther Ex             Heel slides 10 x10" 10x10" 10 x10" 10x10'' 10 x10" 10 x10"    10 x10"   Quad sets 10 x5" 10x5" 10 x5" 10x5'' 10 x5" 10 x5"    10 x5"   SAQ             Rec bike Full 5' Full 5' Full 5' Full 5' Full 5' Full 5'    Full 5'   LAQ 5#  3x10 5#  3x10 10#  3x10 10#  3x10 15#  3x10 15#  3x10    2# 3x10   Mini squats             Leg Press St 9- single leg 45#  3x10 55#  3x10 55#  3x10 65# 3x10 65#  3x10 75#  3x10    45# 3x10   TKE w/ van 15#  2x10 x5" 16#  3x10   5" hold 16#  3x10 x5" hold 18# 3x10 5'' hold 18#  3x10 5" hold 18#  3x10 x5"    15# 2x10 x5"   Functional squat - 8" step touch      2x10       Prone knee extension LLPS 5# ankle weight  5' 5#  Ankle weight   5' 5# ankle weight 5' 5# ankle weight 5' 5# ankle weight 5' 5# ankle weight 5'       Ther Activity             6in step 2x10 2x10 2x10 no rail 2x10 no rail  8" 8"  2x10 no rail    2x10   Gait training 5' no cane 5' no cane 5' no cane 5'   no cane 5' no cane 5' no cane    5" no cane                Gait Training             Ambulation                           Modalities

## 2022-03-07 ENCOUNTER — APPOINTMENT (OUTPATIENT)
Dept: PHYSICAL THERAPY | Facility: CLINIC | Age: 62
End: 2022-03-07
Payer: COMMERCIAL

## 2022-03-11 ENCOUNTER — OFFICE VISIT (OUTPATIENT)
Dept: PHYSICAL THERAPY | Facility: CLINIC | Age: 62
End: 2022-03-11
Payer: COMMERCIAL

## 2022-03-11 DIAGNOSIS — Z96.651 S/P TOTAL KNEE ARTHROPLASTY, RIGHT: ICD-10-CM

## 2022-03-11 DIAGNOSIS — Z96.651 STATUS POST TOTAL RIGHT KNEE REPLACEMENT: ICD-10-CM

## 2022-03-11 DIAGNOSIS — M17.11 PRIMARY OSTEOARTHRITIS OF RIGHT KNEE: Primary | ICD-10-CM

## 2022-03-11 PROCEDURE — 97110 THERAPEUTIC EXERCISES: CPT | Performed by: PHYSICAL THERAPIST

## 2022-03-11 PROCEDURE — 97140 MANUAL THERAPY 1/> REGIONS: CPT | Performed by: PHYSICAL THERAPIST

## 2022-03-11 NOTE — PROGRESS NOTES
PT Re-Evaluation     Today's date: 3/11/2022  Patient name: Jahaira Sanchez  : 1960  MRN: [id number]  Referring provider: Rene Nicolas MD  Dx:   Encounter Diagnosis     ICD-10-CM    1  Primary osteoarthritis of right knee  M17 11    2  S/P total knee arthroplasty, right  Z96 651    3  Status post total right knee replacement  Z96 651                   Assessment  Assessment details: Patient is a 60y o  year old male who attended physical therapy for 16 treatment sessions s/p right TKA on 1/10/2022   Patient reports 75% improvement at this time which correlates to improved FOTO scoring  Patient has shown improvement throughout PT by demonstrating decreased pain, increased range of motion, increased strength, improved tolerance to activity and improved gait/balance  Patient continues to present with pain, decreased ROM, decreased strength, and decreased tolerance to activity  [place] would benefit from continued physical therapy to address these issues and to maximize function  Thank you       Impairments: abnormal gait, abnormal or restricted ROM, abnormal movement, activity intolerance, impaired balance, impaired physical strength, pain with function and weight-bearing intolerance  Understanding of Dx/Px/POC: excellent  Goals  Short Term Goals: to be achieved by 4 weeks  1) Patient will be independent with initial HEP (MET)  2) Decrease pain at worst by 2 points on NPRS (MET)  3) Increase knee flexion PROM to 100 degrees (MET)  4) Increase knee extension PROM to < or equal to +10 degrees (MET)  5) Increase LE strength by 1/2 MMT grade in all deficient planes (MET)  6) Patient to negotiate steps with a reciprocal pattern with use of handrail (MET)    Long Term Goals: to be achieved by discharge  1) Increase FOTO score > or equal to expected outcome (PROGRESSING)  2) Patient to be independent with comprehensive HEP (PROGRESSING)  3) Abolish pain for improved quality of life (PROGRESSING)  4) Achieve full knee extension ROM to improve a/iadls (PROGRESSING)  5) Increase knee flexion ROM to > or equal to 120 degrees (MET)  6) Patient to negotiate steps with a reciprocal pattern without use of handrail (PROGRESSING)  7) Patient to report no sleep interruption secondary to pain (PROGRESSING)    Plan  Patient would benefit from: skilled PT  Planned therapy interventions: joint mobilization, manual therapy, neuromuscular re-education, patient education, strengthening, stretching, therapeutic activities, therapeutic exercise, home exercise program, gait training, functional ROM exercises, balance/weight bearing training and ADL training  Frequency: 2x week  Duration in weeks: 8  Treatment plan discussed with: patient        Subjective Evaluation    History of Present Illness  Mechanism of injury: Patient presents to outpatient PT for a pre-op visit for a right TKA scheduled for 01/10/2021  Patient notes that his current pain limits him from normal ADL's and leisure functions including steps, walking downhill, carrying objects, squatting with weight and uneven surfaces  Patient is retired but stays active via hunting, fishing and hiking  Patients goals for pre-op PT are to gain a better understanding of the post-procedure process and following surgery rehab to achieve functional goals and decrease pain  1/14/2022: Patient reports back to outpatient PT following his total knee replacement on 01/10/2022  Patient was admitted for a 1 day stay and d/c to home  Patient states that he has been compliant with his HEP several times per day  3/11/2022: Patient states that he has been ambulating and has returned to driving  Secondary to mild strength deficits with functional tasks and end range knee extension deficits, he would like to continue for a few more weeks and gain independence with a comprehensive HEP    Patients largest functional deficits remain mild gait deviations, squat/stair mechanics and return to leisure function            Recurrent probem    Quality of life: excellent    Pain  Current pain ratin  At best pain ratin  At worst pain ratin  Quality: dull ache, tight and sharp  Relieving factors: rest and relaxation  Aggravating factors: stair climbing and walking  Progression: worsening    Social Support  Steps to enter house: yes  Stairs in house: yes   Lives in: multiple-level home  Lives with: spouse    Employment status: working  Hand dominance: right      Diagnostic Tests  X-ray: abnormal  Treatments  Previous treatment: injection treatment and medication  Current treatment: medication  Patient Goals  Patient goals for therapy: increased strength, independence with ADLs/IADLs, return to sport/leisure activities, return to work, increased motion and decreased pain          Objective     Neurological Testing     Sensation     Knee   Left Knee   Intact: light touch    Right Knee   Intact: light touch     Additional Neurological Details  (-) Homans sign    Active Range of Motion   Left Knee   Flexion: WFL  Extension: WFL    Right Knee   Flexion: 130 degrees with pain  Extension: 10 degrees with pain    Passive Range of Motion     Right Knee   Flexion: 130 degrees with pain  Extension: 5 degrees with pain    Mobility   Patellar Mobility:     Right Knee   WFL: medial, lateral, superior and inferior    Strength/Myotome Testing     Left Knee   Flexion: 5  Extension: 5    Right Knee   Quadriceps contraction: poor    Tests     Additional Tests Details  5XSTS: 16 31 seconds without UE assist (2022: 22 73 seconds w/ B/L UE assist) (3/11/2022: 10 09 seconds with UE push off)  TU 55 seconds without AD or UE assist (2022: 19 02 seconds) (3/11/2022: 5 36 seconds no AD)    Ambulation     Ambulation: Level Surfaces     Additional Level Surfaces Ambulation Details  Gait assessment: Patient utilizing a RW with slow gait speed and decreased knee flexion during swing with decreased knee extension in terminal stance             Precautions: R TKA scheduled for 01/10/2022    Manuals 2/15 2/18 2/22 2/25 2/28 3/4 3/11   2/10   R knee PROM  20' JF 20' WJB 20' 20' 12'   HI   Patellar mobs 5' JF 5' WJB 5' 5' 5'   PROM- HI   Scar mobs             Re-eval       10'      Neuro Re-Ed             Quad activation techniques             Quad sets             Weight shifts             Cueing                                                     Ther Ex             Heel slides 10 x10" 10x10" 10 x10" 10x10'' 10 x10" 10 x10" 10 x10"   10 x10"   Quad sets 10 x5" 10x5" 10 x5" 10x5'' 10 x5" 10 x5" 10 x5"   10 x5"   SAQ             Rec bike Full 5' Full 5' Full 5' Full 5' Full 5' Full 5' Full 5'   Full 5'   LAQ 5#  3x10 5#  3x10 10#  3x10 10#  3x10 15#  3x10 15#  3x10 NV   2# 3x10   Mini squats             Leg Press St 9- single leg 45#  3x10 55#  3x10 55#  3x10 65# 3x10 65#  3x10 75#  3x10 75#  3x10   45# 3x10   TKE w/ van 15#  2x10 x5" 16#  3x10   5" hold 16#  3x10 x5" hold 18# 3x10 5'' hold 18#  3x10 5" hold 18#  3x10 x5" 18#  3x10 x5"   15# 2x10 x5"   Functional squat - 8" step touch      2x10 2x10      Prone knee extension LLPS 5# ankle weight  5' 5#  Ankle weight   5' 5# ankle weight 5' 5# ankle weight 5' 5# ankle weight 5' 5# ankle weight 5' 7 5# ankle weight 5'      Steps       8"  2x10 no rail                   Ther Activity             6in step 2x10 2x10 2x10 no rail 2x10 no rail  8" 8"  2x10 no rail    2x10   Gait training 5' no cane 5' no cane 5' no cane 5'   no cane 5' no cane 5' no cane    5" no cane                Gait Training             Ambulation                           Modalities

## 2022-03-14 ENCOUNTER — OFFICE VISIT (OUTPATIENT)
Dept: PHYSICAL THERAPY | Facility: CLINIC | Age: 62
End: 2022-03-14
Payer: COMMERCIAL

## 2022-03-14 DIAGNOSIS — Z96.651 STATUS POST TOTAL RIGHT KNEE REPLACEMENT: ICD-10-CM

## 2022-03-14 DIAGNOSIS — M17.11 PRIMARY OSTEOARTHRITIS OF RIGHT KNEE: Primary | ICD-10-CM

## 2022-03-14 DIAGNOSIS — Z96.651 S/P TOTAL KNEE ARTHROPLASTY, RIGHT: ICD-10-CM

## 2022-03-14 PROCEDURE — 97140 MANUAL THERAPY 1/> REGIONS: CPT | Performed by: PHYSICAL THERAPIST

## 2022-03-14 PROCEDURE — 97110 THERAPEUTIC EXERCISES: CPT | Performed by: PHYSICAL THERAPIST

## 2022-03-14 NOTE — PROGRESS NOTES
Daily Note     Today's date: 3/14/2022  Patient name: Sharon Ham  : 1960  MRN: [id number]  Referring provider: Benji Ireland MD  Dx:   Encounter Diagnosis     ICD-10-CM    1  Primary osteoarthritis of right knee  M17 11    2  S/P total knee arthroplasty, right  Z96 651    3  Status post total right knee replacement  Z96 651                   Subjective: Patient reports HEP compliance and notes improved sleep quality last night  Objective: See treatment diary below      Assessment: Right knee PROM extension continues to slowly, steadily improve  Patients functional mobility also improving with increased eccentric stair control and sit to stand mechanics  Began SLS to improve neuromuscular control and patient tolerated well  Plan: Continue per plan of care        Precautions: R TKA scheduled for 01/10/2022    Manuals 2/15 2/18 2/22 2/25 2/28 3/4 3/11 3/14  2/10   R knee PROM  20' JF 20' WJB 20' 20' 12' 12'  HI   Patellar mobs 5' JF 5' WJB 5' 5' 5' 5'  PROM- HI   Scar mobs             Re-eval       10'      Neuro Re-Ed             Quad activation techniques             Quad sets             Weight shifts             Cueing                                                     Ther Ex             Heel slides 10 x10" 10x10" 10 x10" 10x10'' 10 x10" 10 x10" 10 x10" hold  10 x10"   Quad sets 10 x5" 10x5" 10 x5" 10x5'' 10 x5" 10 x5" 10 x5" 10 x5"  10 x5"   SAQ             Rec bike Full 5' Full 5' Full 5' Full 5' Full 5' Full 5' Full 5' Full 5'  Full 5'   LAQ 5#  3x10 5#  3x10 10#  3x10 10#  3x10 15#  3x10 15#  3x10 NV   2# 3x10   Mini squats             Leg Press St 9- single leg 45#  3x10 55#  3x10 55#  3x10 65# 3x10 65#  3x10 75#  3x10 75#  3x10 75#  3x10  45# 3x10   TKE w/ van 15#  2x10 x5" 16#  3x10   5" hold 16#  3x10 x5" hold 18# 3x10 5'' hold 18#  3x10 5" hold 18#  3x10 x5" 18#  3x10 x5" 25#  3x10 x5"  15# 2x10 x5"   Functional squat - 8" step touch      2x10 2x10 2x10     Prone knee extension LLPS 5# ankle weight  5' 5#  Ankle weight   5' 5# ankle weight 5' 5# ankle weight 5' 5# ankle weight 5' 5# ankle weight 5' 7 5# ankle weight 5' 10# ankle weight 5'     Steps       8"  2x10 no rail 8"  2x10 no rail     SLS on foam        4x30"     Ther Activity             6in step 2x10 2x10 2x10 no rail 2x10 no rail  8" 8"  2x10 no rail    2x10   Gait training 5' no cane 5' no cane 5' no cane 5'   no cane 5' no cane 5' no cane    5" no cane                Gait Training             Ambulation                           Modalities

## 2022-03-18 ENCOUNTER — OFFICE VISIT (OUTPATIENT)
Dept: PHYSICAL THERAPY | Facility: CLINIC | Age: 62
End: 2022-03-18
Payer: COMMERCIAL

## 2022-03-18 DIAGNOSIS — Z96.651 STATUS POST TOTAL RIGHT KNEE REPLACEMENT: ICD-10-CM

## 2022-03-18 DIAGNOSIS — Z96.651 S/P TOTAL KNEE ARTHROPLASTY, RIGHT: ICD-10-CM

## 2022-03-18 DIAGNOSIS — M17.11 PRIMARY OSTEOARTHRITIS OF RIGHT KNEE: Primary | ICD-10-CM

## 2022-03-18 PROCEDURE — 97140 MANUAL THERAPY 1/> REGIONS: CPT | Performed by: PHYSICAL THERAPIST

## 2022-03-18 PROCEDURE — 97110 THERAPEUTIC EXERCISES: CPT | Performed by: PHYSICAL THERAPIST

## 2022-03-18 NOTE — PROGRESS NOTES
Daily Note     Today's date: 3/18/2022  Patient name: Cathleen Riggs  : 1960  MRN: [id number]  Referring provider: Joanna Ayala MD  Dx:   Encounter Diagnosis     ICD-10-CM    1  Primary osteoarthritis of right knee  M17 11    2  S/P total knee arthroplasty, right  Z96 651    3  Status post total right knee replacement  Z96 651                   Subjective: Patient reports HEP compliance and states that overall his function continues to improve  Objective: See treatment diary below      Assessment: Patients right knee PROM continues to improve with flexion WNL, but extension deficit remains present (5° passively)  Patients gait mechanics continues to improve with increased terminal knee extension and appropriate heel strike  Quad strength also progressing appropriately with improved 8" step eccentric control  Plan: Continue per plan of care        Precautions: R TKA scheduled for 01/10/2022    Manuals 2/15 2/18 2/22 2/25 2/28 3/4 3/11 3/14 3/18    R knee PROM  20' JF 20' WJB 20' 20' 12' 12' 12'    Patellar mobs 5' JF 5' WJB 5' 5' 5' 5' 5'    Scar mobs             Re-eval       10'      Neuro Re-Ed             Quad activation techniques             Quad sets             Weight shifts             Cueing                                                     Ther Ex             Heel slides 10 x10" 10x10" 10 x10" 10x10'' 10 x10" 10 x10" 10 x10" hold     Quad sets 10 x5" 10x5" 10 x5" 10x5'' 10 x5" 10 x5" 10 x5" 10 x5" 10 x5"    SAQ             Rec bike Full 5' Full 5' Full 5' Full 5' Full 5' Full 5' Full 5' Full 5' L1  5'    LAQ 5#  3x10 5#  3x10 10#  3x10 10#  3x10 15#  3x10 15#  3x10 NV      Mini squats             Leg Press St 9- single leg 45#  3x10 55#  3x10 55#  3x10 65# 3x10 65#  3x10 75#  3x10 75#  3x10 75#  3x10 75#  3x10    TKE w/ van 15#  2x10 x5" 16#  3x10   5" hold 16#  3x10 x5" hold 18# 3x10 5'' hold 18#  3x10 5" hold 18#  3x10 x5" 18#  3x10 x5" 25#  3x10 x5" 25#  3x10 x5" Functional squat to floor      2x10 2x10 2x10 2x15    Prone knee extension LLPS 5# ankle weight  5' 5#  Ankle weight   5' 5# ankle weight 5' 5# ankle weight 5' 5# ankle weight 5' 5# ankle weight 5' 7 5# ankle weight 5' 10# ankle weight 5' 10#  Ankle weight 5'    Steps       8"  2x10 no rail 8"  2x10 no rail 8"  2x10 no rail    SLS on foam        4x30" 4x30"                              Ther Activity             6in step 2x10 2x10 2x10 no rail 2x10 no rail  8" 8"  2x10 no rail       Gait training 5' no cane 5' no cane 5' no cane 5'   no cane 5' no cane 5' no cane                    Gait Training             Ambulation                           Modalities

## 2022-03-22 ENCOUNTER — APPOINTMENT (OUTPATIENT)
Dept: PHYSICAL THERAPY | Facility: CLINIC | Age: 62
End: 2022-03-22
Payer: COMMERCIAL

## 2022-03-25 ENCOUNTER — APPOINTMENT (OUTPATIENT)
Dept: PHYSICAL THERAPY | Facility: CLINIC | Age: 62
End: 2022-03-25
Payer: COMMERCIAL

## 2022-03-28 ENCOUNTER — APPOINTMENT (OUTPATIENT)
Dept: PHYSICAL THERAPY | Facility: CLINIC | Age: 62
End: 2022-03-28
Payer: COMMERCIAL

## 2022-03-30 ENCOUNTER — APPOINTMENT (OUTPATIENT)
Dept: PHYSICAL THERAPY | Facility: CLINIC | Age: 62
End: 2022-03-30
Payer: COMMERCIAL

## 2022-03-30 NOTE — PROGRESS NOTES
Daily Note     Today's date: 3/30/2022  Patient name: Stephy Gandhi  : 1960  MRN: [id number]  Referring provider: Javed Roberts MD  Dx: No diagnosis found  Subjective: Patient reports HEP compliance while away on vacation and states that he has been performing most ADL's and leisure functions without difficulty but pain remains at end ranges of knee extension  Objective: See treatment diary below      Assessment: Patient demonstrates      Plan: Continue per plan of care        Precautions: R TKA scheduled for 01/10/2022    Manuals 2/15 2/18 2/22 2/25 2/28 3/4 3/11 3/14 3/18 3/30   R knee PROM  20' JF 20' WJB 20' 20' 12' 12' 12'    Patellar mobs 5' JF 5' WJB 5' 5' 5' 5' 5'    Scar mobs             Re-eval       10'      Neuro Re-Ed             Quad activation techniques             Quad sets             Weight shifts             Cueing                                                     Ther Ex             Heel slides 10 x10" 10x10" 10 x10" 10x10'' 10 x10" 10 x10" 10 x10" hold     Quad sets 10 x5" 10x5" 10 x5" 10x5'' 10 x5" 10 x5" 10 x5" 10 x5" 10 x5"    SAQ             Rec bike Full 5' Full 5' Full 5' Full 5' Full 5' Full 5' Full 5' Full 5' L1  5'    LAQ 5#  3x10 5#  3x10 10#  3x10 10#  3x10 15#  3x10 15#  3x10 NV      Mini squats             Leg Press St 9- single leg 45#  3x10 55#  3x10 55#  3x10 65# 3x10 65#  3x10 75#  3x10 75#  3x10 75#  3x10 75#  3x10    TKE w/ van 15#  2x10 x5" 16#  3x10   5" hold 16#  3x10 x5" hold 18# 3x10 5'' hold 18#  3x10 5" hold 18#  3x10 x5" 18#  3x10 x5" 25#  3x10 x5" 25#  3x10 x5"    Functional squat to floor      2x10 2x10 2x10 2x15    Prone knee extension LLPS 5# ankle weight  5' 5#  Ankle weight   5' 5# ankle weight 5' 5# ankle weight 5' 5# ankle weight 5' 5# ankle weight 5' 7 5# ankle weight 5' 10# ankle weight 5' 10#  Ankle weight 5'    Steps       8"  2x10 no rail 8"  2x10 no rail 8"  2x10 no rail    SLS on foam        4x30" 4x30" Ther Activity             6in step 2x10 2x10 2x10 no rail 2x10 no rail  8" 8"  2x10 no rail       Gait training 5' no cane 5' no cane 5' no cane 5'   no cane 5' no cane 5' no cane                    Gait Training             Ambulation                           Modalities

## 2022-04-01 ENCOUNTER — OFFICE VISIT (OUTPATIENT)
Dept: PHYSICAL THERAPY | Facility: CLINIC | Age: 62
End: 2022-04-01
Payer: COMMERCIAL

## 2022-04-01 DIAGNOSIS — Z96.651 S/P TOTAL KNEE ARTHROPLASTY, RIGHT: ICD-10-CM

## 2022-04-01 DIAGNOSIS — M17.11 PRIMARY OSTEOARTHRITIS OF RIGHT KNEE: Primary | ICD-10-CM

## 2022-04-01 DIAGNOSIS — Z96.651 STATUS POST TOTAL RIGHT KNEE REPLACEMENT: ICD-10-CM

## 2022-04-01 PROCEDURE — 97140 MANUAL THERAPY 1/> REGIONS: CPT | Performed by: PHYSICAL THERAPIST

## 2022-04-01 PROCEDURE — 97110 THERAPEUTIC EXERCISES: CPT | Performed by: PHYSICAL THERAPIST

## 2022-04-01 NOTE — PROGRESS NOTES
Daily Note     Today's date: 2022  Patient name: Bill Pearson  : 1960  MRN: [id number]  Referring provider: Cb Tracy MD  Dx:   Encounter Diagnosis     ICD-10-CM    1  Primary osteoarthritis of right knee  M17 11    2  S/P total knee arthroplasty, right  Z96 651    3  Status post total right knee replacement  Z96 651                   Subjective: Patient reports increased right knee tightness from the flight and that he did not perform much TE while away  Objective: See treatment diary below    R knee PROM: 2-127°    Assessment: Patient demonstrating continued improvement in right knee PROM per above  Decreased swelling and tenderness per palpation and joint inspection  Cues required to improve 8" stair eccentric control and patient was able to improve with increased reps  Overall patient continues to progress very well  Plan: Continue per plan of care        Precautions: R TKA scheduled for 01/10/2022    Manuals 2/15 2/18 2/22 2/25 2/28 3/4 3/11 3/14 3/18 4/1   R knee PROM  20' JF 20' WJB 20' 20' 12' 12' 12' 12'   Patellar mobs 5' JF 5' WJB 5' 5' 5' 5' 5' 5'   Scar mobs             Re-eval       10'      Neuro Re-Ed             Quad activation techniques             Quad sets             Weight shifts             Cueing                                                     Ther Ex             Heel slides 10 x10" 10x10" 10 x10" 10x10'' 10 x10" 10 x10" 10 x10" hold     Quad sets 10 x5" 10x5" 10 x5" 10x5'' 10 x5" 10 x5" 10 x5" 10 x5" 10 x5" 10 x5"   SAQ             Rec bike Full 5' Full 5' Full 5' Full 5' Full 5' Full 5' Full 5' Full 5' L1  5' L1  5'   LAQ 5#  3x10 5#  3x10 10#  3x10 10#  3x10 15#  3x10 15#  3x10 NV      Mini squats             Leg Press St 9- single leg 45#  3x10 55#  3x10 55#  3x10 65# 3x10 65#  3x10 75#  3x10 75#  3x10 75#  3x10 75#  3x10 95#  3x10   TKE w/ van 15#  2x10 x5" 16#  3x10   5" hold 16#  3x10 x5" hold 18# 3x10 5'' hold 18#  3x10 5" hold 18#  3x10 x5" 18#  3x10 x5" 25#  3x10 x5" 25#  3x10 x5" 25#  3x10 x5"   Functional squat to floor      2x10 2x10 2x10 2x15 3x12   Prone knee extension LLPS 5# ankle weight  5' 5#  Ankle weight   5' 5# ankle weight 5' 5# ankle weight 5' 5# ankle weight 5' 5# ankle weight 5' 7 5# ankle weight 5' 10# ankle weight 5' 10#  Ankle weight 5' 10# ankle weight 5'   Steps       8"  2x10 no rail 8"  2x10 no rail 8"  2x10 no rail 8"  2x10 no rail   SLS on foam        4x30" 4x30" 4x30"                             Ther Activity             6in step 2x10 2x10 2x10 no rail 2x10 no rail  8" 8"  2x10 no rail       Gait training 5' no cane 5' no cane 5' no cane 5'   no cane 5' no cane 5' no cane                    Gait Training             Ambulation                           Modalities

## 2022-04-04 ENCOUNTER — OFFICE VISIT (OUTPATIENT)
Dept: PHYSICAL THERAPY | Facility: CLINIC | Age: 62
End: 2022-04-04
Payer: COMMERCIAL

## 2022-04-04 DIAGNOSIS — Z96.651 S/P TOTAL KNEE ARTHROPLASTY, RIGHT: ICD-10-CM

## 2022-04-04 DIAGNOSIS — M17.11 PRIMARY OSTEOARTHRITIS OF RIGHT KNEE: Primary | ICD-10-CM

## 2022-04-04 DIAGNOSIS — Z96.651 STATUS POST TOTAL RIGHT KNEE REPLACEMENT: ICD-10-CM

## 2022-04-04 PROCEDURE — 97140 MANUAL THERAPY 1/> REGIONS: CPT | Performed by: PHYSICAL THERAPIST

## 2022-04-04 PROCEDURE — 97110 THERAPEUTIC EXERCISES: CPT | Performed by: PHYSICAL THERAPIST

## 2022-04-04 NOTE — PROGRESS NOTES
Daily Note     Today's date: 2022  Patient name: Maria Alejandra Hewitt  : 1960  MRN: [id number]  Referring provider: Willis Chinchilla MD  Dx:   Encounter Diagnosis     ICD-10-CM    1  Primary osteoarthritis of right knee  M17 11    2  S/P total knee arthroplasty, right  Z96 651    3  Status post total right knee replacement  Z96 651                   Subjective: Patient reports continued HEP compliance and that overall his function is nearing normal but lateral knee discomfort remains  Objective: See treatment diary below      Assessment: Patient able to demonstrate continued improvement in terminal knee extension PROM and AROM  Patients functional mobility including squats, stair navigation and ambulation have all improved due to quad strength gains and ROM improvements  Overall patient continues to make gains toward all goals  Plan: Continue per plan of care        Precautions: R TKA scheduled for 01/10/2022    Manuals    R knee PROM  12'         12'   Patellar mobs 3'         5'   Scar mobs             Re-eval             Neuro Re-Ed             Quad activation techniques             Quad sets             Weight shifts             Cueing                                                     Ther Ex             Heel slides 5x5"            Quad sets 5x5"         10 x5"   SAQ             Rec bike L2  5'         L1  5'   LAQ             Mini squats             Leg Press St 9- single leg 95#  3x10         95#  3x10   TKE w/ van 25#  3x10 x5"         25#  3x10 x5"   Functional squat to floor 3x12         3x12   Prone knee extension LLPS 10#  Ankle weight 5'         10# ankle weight 5'   Steps 8"  2x10 no rail         8"  2x10 no rail   SLS on foam 4x30"         4x30"   Rocker board squats 2x10                         Ther Activity             6in step             Gait training                          Gait Training             Ambulation                           Modalities

## 2022-04-06 ENCOUNTER — HOSPITAL ENCOUNTER (OUTPATIENT)
Dept: RADIOLOGY | Facility: HOSPITAL | Age: 62
Discharge: HOME | End: 2022-04-06
Attending: ORTHOPAEDIC SURGERY
Payer: COMMERCIAL

## 2022-04-06 ENCOUNTER — OFFICE VISIT (OUTPATIENT)
Dept: OBGYN CLINIC | Facility: HOSPITAL | Age: 62
End: 2022-04-06

## 2022-04-06 VITALS
HEART RATE: 66 BPM | WEIGHT: 184 LBS | SYSTOLIC BLOOD PRESSURE: 147 MMHG | BODY MASS INDEX: 24.92 KG/M2 | DIASTOLIC BLOOD PRESSURE: 92 MMHG | HEIGHT: 72 IN

## 2022-04-06 DIAGNOSIS — Z96.651 S/P TKR (TOTAL KNEE REPLACEMENT), RIGHT: Primary | ICD-10-CM

## 2022-04-06 DIAGNOSIS — Z96.651 AFTERCARE FOLLOWING RIGHT KNEE JOINT REPLACEMENT SURGERY: ICD-10-CM

## 2022-04-06 DIAGNOSIS — Z47.1 AFTERCARE FOLLOWING RIGHT KNEE JOINT REPLACEMENT SURGERY: ICD-10-CM

## 2022-04-06 DIAGNOSIS — M70.61 TROCHANTERIC BURSITIS OF RIGHT HIP: ICD-10-CM

## 2022-04-06 PROCEDURE — 99024 POSTOP FOLLOW-UP VISIT: CPT | Performed by: ORTHOPAEDIC SURGERY

## 2022-04-06 PROCEDURE — 73560 X-RAY EXAM OF KNEE 1 OR 2: CPT

## 2022-04-06 NOTE — PROGRESS NOTES
Assessment:   Diagnosis ICD-10-CM Associated Orders   1  S/P TKR (total knee replacement), right  Z96 651    2  Trochanteric bursitis of right hip  M70 61        Plan:  · A discussion was had with the patient that his imaging looks very good at today's visit  · He may continue to be as active as he can  He was advised to avoid high-impact activities such as running and jumping as these can decrease the longevity of the knee replacement  · He may see a dentist for routine cleanings at this point  · We discussed the importance of dental antibiotic prophylaxis for two years after surgery  · The patient received a total joint replacement card at a previous visit  · We offered a corticosteroid injection for greater trochanteric bursitis of the right hip, which the patient stated he wished to postpone for now  To do next visit:  Follow-up in 3 months for further imaging and to assess post-op pain and function  The above stated was discussed in layman's terms and the patient expressed understanding  All questions were answered to the patient's satisfaction  Subjective:   Eriberto Baca is a 60 y o  male who presents for a 3-month post-op visit from his right TKA on 1/10  The patient states that he has intermittent pain with certain activities, but it is not significant and he does not take anything for pain  He is still going to PT  He is wondering when he can start going to the dentist for cleanings again  Today the patient is also complaining of some right hip pain which has gotten better since his knee surgery  He is very satisfied with his progress so far          Review of systems negative unless otherwise specified in HPI    Past Medical History:   Diagnosis Date    Fractures     History of transfusion     Hypertension        Past Surgical History:   Procedure Laterality Date    ABDOMINAL SURGERY      KNEE ARTHROSCOPY      KNEE SURGERY      NC TOTAL KNEE ARTHROPLASTY Right 1/10/2022    Procedure: ARTHROPLASTY KNEE TOTAL W ROBOT;  Surgeon: David Khan MD;  Location: [facility];  Service: Orthopedics    ROTATOR CUFF REPAIR      SHOULDER SURGERY      SPLENECTOMY         History reviewed  No pertinent family history  Social History     Occupational History    Not on file   Tobacco Use    Smoking status: Former Smoker    Smokeless tobacco: Never Used   Vaping Use    Vaping Use: Never used   Substance and Sexual Activity    Alcohol use: Yes     Alcohol/week: 2 0 standard drinks     Types: 1 Cans of beer, 1 Shots of liquor per week     Comment: daily after dinner    Drug use: Yes     Types: Marijuana     Comment: CBD gummies    Sexual activity: Not on file         Current Outpatient Medications:     lisinopril (ZESTRIL) 10 mg tablet, Take 10 mg by mouth daily (Patient not taking: Reported on 1/25/2022 ), Disp: , Rfl:     Multiple Vitamin (MULTIVITAMIN ADULT PO), Take by mouth, Disp: , Rfl:     Multiple Vitamins-Minerals (PRESERVISION AREDS 2 PO), Take by mouth, Disp: , Rfl:     No Known Allergies         Vitals:    04/06/22 1253   BP: 147/92   Pulse: 66       Objective:    General:  Patient is WDWN, alert and oriented, appears stated age, and is in no acute distress  Musculoskeletal:    Right Hip:    Inspection:  There is no visible bony deformity of the hip  Palpation:  The patient is tender over the greater trochanteric bursa  Right Knee: Inspection:  The incision is well-approximated and well-healed without erythema or purulent material indicative of infection  There is no visible edema  There is no ecchymosis  Range of Motion:  The patient is able to full active extension and approximately 115 degrees of active flexion in the knee  Palpation:  The patient is not tender with palpation over any aspect of the knee or the incision site  Sensation:  SILT over the knee      Special Tests:  Negative Ballottement test       Diagnostics, reviewed and taken today if performed as documented: The attending physician has personally reviewed the pertinent films in PACS and interpretation is as follows: The patient's hardware is well-bonded to bone without evidence of hardware failure or loosening  Procedures, if performed today:    None performed      Portions of the record may have been created with voice recognition software  Occasional wrong word or "sound a like" substitutions may have occurred due to the inherent limitations of voice recognition software  Read the chart carefully and recognize, using context, where substitutions have occurred

## 2022-04-08 ENCOUNTER — OFFICE VISIT (OUTPATIENT)
Dept: PHYSICAL THERAPY | Facility: CLINIC | Age: 62
End: 2022-04-08
Payer: COMMERCIAL

## 2022-04-08 DIAGNOSIS — Z96.651 STATUS POST TOTAL RIGHT KNEE REPLACEMENT: ICD-10-CM

## 2022-04-08 DIAGNOSIS — Z96.651 S/P TOTAL KNEE ARTHROPLASTY, RIGHT: ICD-10-CM

## 2022-04-08 DIAGNOSIS — M17.11 PRIMARY OSTEOARTHRITIS OF RIGHT KNEE: Primary | ICD-10-CM

## 2022-04-08 PROCEDURE — 97110 THERAPEUTIC EXERCISES: CPT | Performed by: PHYSICAL THERAPIST

## 2022-04-08 PROCEDURE — 97140 MANUAL THERAPY 1/> REGIONS: CPT | Performed by: PHYSICAL THERAPIST

## 2022-04-08 NOTE — PROGRESS NOTES
Daily Note     Today's date: 2022  Patient name: Caroline Krueger  : 1960  MRN: [id number]  Referring provider: Marisela Valenzuela MD  Dx:   Encounter Diagnosis     ICD-10-CM    1  Primary osteoarthritis of right knee  M17 11    2  S/P total knee arthroplasty, right  Z96 651    3  Status post total right knee replacement  Z96 651                   Subjective: Patient reports he received a good report from his ortho surgeon and that he just has normal tightness today  Objective: See treatment diary below      Assessment: Patient demonstrating right knee extension gains, able to achieve 0° of extension with overpressure  Mild quad insufficiency present in this range and patient was educated to perform the quad sets with passive overpressure into extension at home  Overall patient demonstrates independent function with stair navigation and squats due to strength gains and ROM  Plan: Continue per plan of care        Precautions: R TKA scheduled for 01/10/2022    Manuals    R knee PROM  12' 10'        12'   Patellar mobs 3' 2'        5'   Scar mobs             Re-eval             Neuro Re-Ed             Quad activation techniques             Quad sets             Weight shifts             Cueing                                                     Ther Ex             Heel slides 5x5" 5x5"           Quad sets 5x5" 5x5"        10 x5"   SAQ             Upright bike L2  5' L3  5'        L1  5'   LAQ             Mini squats             Leg Press St 9- single leg 95#  3x10 95#  3x10        95#  3x10   TKE w/ van 25#  3x10 x5" 25#  3x10 x5"        25#  3x10 x5"   Functional squat to floor 3x12 3x12        3x12   Prone knee extension LLPS 10#  Ankle weight 5' D/C        10# ankle weight 5'   Steps 8"  2x10 no rail 8"  2x10 no rail        8"  2x10 no rail   SLS on foam 4x30" 4x30"        4x30"   Rocker board squats 2x10 2x10           Quad set with overpressure  2x5 x10"           Ther Activity             6in step             Gait training                          Gait Training             Ambulation                           Modalities

## 2022-04-11 ENCOUNTER — OFFICE VISIT (OUTPATIENT)
Dept: PHYSICAL THERAPY | Facility: CLINIC | Age: 62
End: 2022-04-11
Payer: COMMERCIAL

## 2022-04-11 DIAGNOSIS — Z96.651 STATUS POST TOTAL RIGHT KNEE REPLACEMENT: ICD-10-CM

## 2022-04-11 DIAGNOSIS — Z96.651 S/P TOTAL KNEE ARTHROPLASTY, RIGHT: ICD-10-CM

## 2022-04-11 DIAGNOSIS — M17.11 PRIMARY OSTEOARTHRITIS OF RIGHT KNEE: Primary | ICD-10-CM

## 2022-04-11 PROCEDURE — 97140 MANUAL THERAPY 1/> REGIONS: CPT | Performed by: PHYSICAL THERAPIST

## 2022-04-11 PROCEDURE — 97110 THERAPEUTIC EXERCISES: CPT | Performed by: PHYSICAL THERAPIST

## 2022-04-11 NOTE — PROGRESS NOTES
Daily Note     Today's date: 2022  Patient name: Leodan Aviles  : 1960  MRN: [id number]  Referring provider: Funmilayo Short MD  Dx:   Encounter Diagnosis     ICD-10-CM    1  Primary osteoarthritis of right knee  M17 11    2  S/P total knee arthroplasty, right  Z96 651    3  Status post total right knee replacement  Z96 651                   Subjective: Patient reports HEP compliance and states that he continues to perform most ADL's and tasks without difficulty  Objective: See treatment diary below      Assessment: Mild end range knee extension deficits persist per PROM/AROM  Good squat and stair mechanics with cues to improve eccentric component  Equal strength and weight bearing noted with rocker board squats  Overall patient continues to meet goals and will d/c next visit  Plan: Continue per plan of care        Precautions: R TKA scheduled for 01/10/2022    Manuals    R knee PROM  12' 10' 10'       12'   Patellar mobs 3' 2' 2'       5'   Scar mobs             Re-eval             Neuro Re-Ed             Quad activation techniques             Quad sets             Weight shifts             Cueing                                                     Ther Ex             Heel slides 5x5" 5x5" 5x5"          Quad sets 5x5" 5x5" 2x5 x5"       10 x5"   SAQ             Upright bike L2  5' L3  5' L10  5'       L1  5'   LAQ             Mini squats             Leg Press St 9- single leg 95#  3x10 95#  3x10 95#  3x10       95#  3x10   TKE w/ van 25#  3x10 x5" 25#  3x10 x5" 25#  3x10  x5"       25#  3x10 x5"   Functional squat to floor 3x12 3x12 3x12       3x12   Prone knee extension LLPS 10#  Ankle weight 5' D/C        10# ankle weight 5'   Steps 8"  2x10 no rail 8"  2x10 no rail 8"  2x10 no rail       8"  2x10 no rail   SLS on foam 4x30" 4x30" 4x30"       4x30"   Rocker board squats 2x10 2x10 2x10          Quad set with overpressure  2x5 x10" 2x5 x10"          Ther Activity             6in step             Gait training                          Gait Training             Ambulation                           Modalities

## 2022-04-15 ENCOUNTER — EVALUATION (OUTPATIENT)
Dept: PHYSICAL THERAPY | Facility: CLINIC | Age: 62
End: 2022-04-15
Payer: COMMERCIAL

## 2022-04-15 DIAGNOSIS — Z96.651 STATUS POST TOTAL RIGHT KNEE REPLACEMENT: ICD-10-CM

## 2022-04-15 DIAGNOSIS — Z96.651 S/P TOTAL KNEE ARTHROPLASTY, RIGHT: ICD-10-CM

## 2022-04-15 DIAGNOSIS — M17.11 PRIMARY OSTEOARTHRITIS OF RIGHT KNEE: Primary | ICD-10-CM

## 2022-04-15 PROCEDURE — 97140 MANUAL THERAPY 1/> REGIONS: CPT | Performed by: PHYSICAL THERAPIST

## 2022-04-15 PROCEDURE — 97110 THERAPEUTIC EXERCISES: CPT | Performed by: PHYSICAL THERAPIST

## 2022-04-15 NOTE — PROGRESS NOTES
PT Re-Evaluation  and PT Discharge    Today's date: 4/15/2022  Patient name: Isacc Gonzalez  : 1960  MRN: [id number]  Referring provider: Claude Crease, MD  Dx:   Encounter Diagnosis     ICD-10-CM    1  Primary osteoarthritis of right knee  M17 11    2  S/P total knee arthroplasty, right  Z96 651    3  Status post total right knee replacement  Z96 651                   Assessment  Assessment details: Patient is a 60y o  year old male who attended physical therapy for 23 treatment sessions s/p right TKA on 1/10/2022  Patient reports 100% improvement at this time which correlates with FOTO scoring  Patient has shown improvement throughout PT by demonstrating decreased pain, increased range of motion, increased strength, improved tolerance to activity and improved gait/balance  Secondary to achieving functional goals and independence with comprehensive Home Exercise Program, Qiana Grossman will be discharged from PT at this time  Thank you       Impairments: abnormal gait, abnormal or restricted ROM, abnormal movement, activity intolerance, impaired balance, impaired physical strength, pain with function and weight-bearing intolerance  Understanding of Dx/Px/POC: excellent  Goals  Short Term Goals: to be achieved by 4 weeks  1) Patient will be independent with initial HEP (MET)  2) Decrease pain at worst by 2 points on NPRS (MET)  3) Increase knee flexion PROM to 100 degrees (MET)  4) Increase knee extension PROM to < or equal to +10 degrees (MET)  5) Increase LE strength by 1/2 MMT grade in all deficient planes (MET)  6) Patient to negotiate steps with a reciprocal pattern with use of handrail (MET)    Long Term Goals: to be achieved by discharge  1) Increase FOTO score > or equal to expected outcome (MET)  2) Patient to be independent with comprehensive HEP (MET)  3) Abolish pain for improved quality of life (MET)  4) Achieve full knee extension ROM to improve a/iadls (MET)  5) Increase knee flexion ROM to > or equal to 120 degrees (MET)  6) Patient to negotiate steps with a reciprocal pattern without use of handrail (MET)  7) Patient to report no sleep interruption secondary to pain (MET)    Plan  Plan details: D/C from PT  Patient would benefit from: skilled PT  Planned therapy interventions: joint mobilization, manual therapy, neuromuscular re-education, patient education, strengthening, stretching, therapeutic activities, therapeutic exercise, home exercise program, gait training, functional ROM exercises, balance/weight bearing training and ADL training  Treatment plan discussed with: patient        Subjective Evaluation    History of Present Illness  Mechanism of injury: Patient presents to outpatient PT for a pre-op visit for a right TKA scheduled for 01/10/2021  Patient notes that his current pain limits him from normal ADL's and leisure functions including steps, walking downhill, carrying objects, squatting with weight and uneven surfaces  Patient is retired but stays active via hunting, fishing and hiking  Patients goals for pre-op PT are to gain a better understanding of the post-procedure process and following surgery rehab to achieve functional goals and decrease pain  1/14/2022: Patient reports back to outpatient PT following his total knee replacement on 01/10/2022  Patient was admitted for a 1 day   stay and d/c to home  Patient states that he has been compliant with his HEP several times per day  3/11/2022: Patient states that he has been ambulating and has returned to driving  Secondary to mild strength deficits with functional tasks and end range knee extension deficits, he would like to continue for a few more weeks and gain independence with a comprehensive HEP  Patients largest functional deficits remain mild gait deviations, squat/stair mechanics and return to leisure function      4/15/2022: Patient reports continued HEP compliance and states that her has returned to all leisure functions and ADLs with minimal difficulty  Greatest deficit is functional strength but patient is confident this will continue to improve with his independent HEP            Recurrent probem    Quality of life: excellent    Pain  Current pain ratin  At best pain ratin  At worst pain ratin  Quality: dull ache, tight and sharp  Relieving factors: rest and relaxation  Aggravating factors: stair climbing and walking  Progression: worsening    Social Support  Steps to enter house: yes  Stairs in house: yes   Lives in: multiple-level home  Lives with: spouse    Employment status: working  Hand dominance: right      Diagnostic Tests  X-ray: abnormal  Treatments  Previous treatment: injection treatment and medication  Current treatment: medication  Patient Goals  Patient goals for therapy: increased strength, independence with ADLs/IADLs, return to sport/leisure activities, return to work, increased motion and decreased pain          Objective     Neurological Testing     Sensation     Knee   Left Knee   Intact: light touch    Right Knee   Intact: light touch     Additional Neurological Details  (-) Homans sign    Active Range of Motion   Left Knee   Flexion: WFL  Extension: WFL    Right Knee   Flexion: 133 degrees with pain  Extension: 7 degrees with pain    Passive Range of Motion     Right Knee   Flexion: 133 degrees with pain  Extension: 2 degrees with pain    Mobility   Patellar Mobility:     Right Knee   WFL: medial, lateral, superior and inferior    Strength/Myotome Testing     Left Knee   Flexion: 5  Extension: 5    Right Knee   Flexion: 5  Extension: 5  Quadriceps contraction: good    Tests     Additional Tests Details  5XSTS: 16 31 seconds without UE assist (2022: 22 73 seconds w/ B/L UE assist) (3/11/2022: 10 09 seconds with UE push off)  TU 55 seconds without AD or UE assist (2022: 19 02 seconds) (3/11/2022: 5 36 seconds no AD)    *Not tested on D/C secondary to prior being within normal ranges    Ambulation     Ambulation: Level Surfaces     Additional Level Surfaces Ambulation Details  Gait assessment: Patient utilizing a RW with slow gait speed and decreased knee flexion during swing with decreased knee extension in terminal stance (RESOLVED on 4/15/2022 without an AD - overall normal gait mechanics present)    Ambulation: Stairs   Ascend stairs: independent  Pattern: reciprocal  Railings: without rails  Descend stairs: independent  Pattern: reciprocal  Railings: without rails  Curbs: independent             Precautions: R TKA scheduled for 01/10/2022    Manuals 4/4 4/8 4/11 4/15      4/1   R knee PROM  12' 10' 10' 10'      12'   Patellar mobs 3' 2' 2' 2'      5'   Scar mobs             Re-eval    15'         Neuro Re-Ed             Quad activation techniques             Quad sets             Weight shifts             Cueing                                                     Ther Ex             Heel slides 5x5" 5x5" 5x5"          Quad sets 5x5" 5x5" 2x5 x5"       10 x5"   SAQ             Upright bike L2  5' L3  5' L10  5' L10  5'      L1  5'   LAQ             Mini squats             Leg Press St 9- single leg 95#  3x10 95#  3x10 95#  3x10       95#  3x10   TKE w/ van 25#  3x10 x5" 25#  3x10 x5" 25#  3x10  x5"       25#  3x10 x5"   Functional squat to floor 3x12 3x12 3x12       3x12   Prone knee extension LLPS 10#  Ankle weight 5' D/C        10# ankle weight 5'   Steps 8"  2x10 no rail 8"  2x10 no rail 8"  2x10 no rail       8"  2x10 no rail   SLS on foam 4x30" 4x30" 4x30"       4x30"   Rocker board squats 2x10 2x10 2x10          Quad set with overpressure  2x5 x10" 2x5 x10"          HEP review    10'         Ther Activity             6in step             Gait training                          Gait Training             Ambulation                           Modalities

## 2022-07-06 DIAGNOSIS — Z96.651 AFTERCARE FOLLOWING RIGHT KNEE JOINT REPLACEMENT SURGERY: Primary | ICD-10-CM

## 2022-07-06 DIAGNOSIS — Z47.1 AFTERCARE FOLLOWING RIGHT KNEE JOINT REPLACEMENT SURGERY: Primary | ICD-10-CM

## 2022-07-19 ENCOUNTER — OFFICE VISIT (OUTPATIENT)
Dept: OBGYN CLINIC | Facility: HOSPITAL | Age: 62
End: 2022-07-19
Payer: COMMERCIAL

## 2022-07-19 ENCOUNTER — HOSPITAL ENCOUNTER (OUTPATIENT)
Dept: RADIOLOGY | Facility: HOSPITAL | Age: 62
Discharge: HOME | End: 2022-07-19
Attending: ORTHOPAEDIC SURGERY
Payer: COMMERCIAL

## 2022-07-19 VITALS
HEART RATE: 58 BPM | SYSTOLIC BLOOD PRESSURE: 158 MMHG | BODY MASS INDEX: 24.95 KG/M2 | HEIGHT: 72 IN | DIASTOLIC BLOOD PRESSURE: 100 MMHG

## 2022-07-19 DIAGNOSIS — Z96.651 AFTERCARE FOLLOWING RIGHT KNEE JOINT REPLACEMENT SURGERY: ICD-10-CM

## 2022-07-19 DIAGNOSIS — Z96.651 AFTERCARE FOLLOWING RIGHT KNEE JOINT REPLACEMENT SURGERY: Primary | ICD-10-CM

## 2022-07-19 DIAGNOSIS — Z47.1 AFTERCARE FOLLOWING RIGHT KNEE JOINT REPLACEMENT SURGERY: Primary | ICD-10-CM

## 2022-07-19 DIAGNOSIS — Z47.1 AFTERCARE FOLLOWING RIGHT KNEE JOINT REPLACEMENT SURGERY: ICD-10-CM

## 2022-07-19 PROCEDURE — 99213 OFFICE O/P EST LOW 20 MIN: CPT | Performed by: ORTHOPAEDIC SURGERY

## 2022-07-19 PROCEDURE — 3080F DIAST BP >= 90 MM HG: CPT | Performed by: ORTHOPAEDIC SURGERY

## 2022-07-19 PROCEDURE — 73560 X-RAY EXAM OF KNEE 1 OR 2: CPT

## 2022-07-19 PROCEDURE — 3077F SYST BP >= 140 MM HG: CPT | Performed by: ORTHOPAEDIC SURGERY

## 2023-01-11 DIAGNOSIS — Z47.1 AFTERCARE FOLLOWING RIGHT KNEE JOINT REPLACEMENT SURGERY: Primary | ICD-10-CM

## 2023-01-11 DIAGNOSIS — Z96.651 AFTERCARE FOLLOWING RIGHT KNEE JOINT REPLACEMENT SURGERY: Primary | ICD-10-CM

## 2023-01-19 ENCOUNTER — OFFICE VISIT (OUTPATIENT)
Dept: OBGYN CLINIC | Facility: HOSPITAL | Age: 63
End: 2023-01-19

## 2023-01-19 ENCOUNTER — HOSPITAL ENCOUNTER (OUTPATIENT)
Dept: RADIOLOGY | Facility: HOSPITAL | Age: 63
Discharge: HOME | End: 2023-01-19
Attending: ORTHOPAEDIC SURGERY

## 2023-01-19 VITALS
HEIGHT: 72 IN | SYSTOLIC BLOOD PRESSURE: 159 MMHG | DIASTOLIC BLOOD PRESSURE: 96 MMHG | BODY MASS INDEX: 24.95 KG/M2 | HEART RATE: 54 BPM

## 2023-01-19 DIAGNOSIS — Z96.651 AFTERCARE FOLLOWING RIGHT KNEE JOINT REPLACEMENT SURGERY: ICD-10-CM

## 2023-01-19 DIAGNOSIS — Z47.1 AFTERCARE FOLLOWING RIGHT KNEE JOINT REPLACEMENT SURGERY: Primary | ICD-10-CM

## 2023-01-19 DIAGNOSIS — Z96.651 AFTERCARE FOLLOWING RIGHT KNEE JOINT REPLACEMENT SURGERY: Primary | ICD-10-CM

## 2023-01-19 DIAGNOSIS — Z47.1 AFTERCARE FOLLOWING RIGHT KNEE JOINT REPLACEMENT SURGERY: ICD-10-CM

## 2023-01-19 NOTE — PROGRESS NOTES
Assessment:  1  Aftercare following right knee joint replacement surgery            Plan:  One year s/p right TKA with robot, 1/10/2022  He is doing well  He is encouraged to remain active  The patient is counseled on prophylactic antibiotic use prior to dental visits  The patient can follow up as needed and is welcome to return at any point with any new or old issue  To do next visit:  Return if symptoms worsen or fail to improve  The above stated was discussed in layman's terms and the patient expressed understanding  All questions were answered to the patient's satisfaction  Scribe Attestation    I,:  Renee Duran am acting as a scribe while in the presence of the attending physician :       I,:  Romi Jordan MD personally performed the services described in this documentation    as scribed in my presence :             Subjective:   Kevin Braxton is a 60 y o  male who presents one year s/p right TKA with robot, 1/10/2022  He is doing well  Today he has no right knee complaints  He is acive without repercussion  He denies medications for this issue  He denies fever, chills or shortness of breath  Review of systems negative unless otherwise specified in HPI    Past Medical History:   Diagnosis Date   • Fractures    • History of transfusion    • Hypertension        Past Surgical History:   Procedure Laterality Date   • ABDOMINAL SURGERY     • KNEE ARTHROSCOPY     • KNEE SURGERY     • MO ARTHRP KNE CONDYLE&PLATU MEDIAL&LAT COMPARTMENTS Right 1/10/2022    Procedure: ARTHROPLASTY KNEE TOTAL W ROBOT;  Surgeon: Romi Jordan MD;  Location:  MAIN OR;  Service: Orthopedics   • ROTATOR CUFF REPAIR     • SHOULDER SURGERY     • SPLENECTOMY         History reviewed  No pertinent family history      Social History     Occupational History   • Not on file   Tobacco Use   • Smoking status: Former   • Smokeless tobacco: Never   Vaping Use   • Vaping Use: Never used Substance and Sexual Activity   • Alcohol use: Yes     Alcohol/week: 2 0 standard drinks     Types: 1 Cans of beer, 1 Shots of liquor per week     Comment: daily after dinner   • Drug use: Yes     Types: Marijuana     Comment: CBD gummies   • Sexual activity: Not on file         Current Outpatient Medications:   •  Multiple Vitamin (MULTIVITAMIN ADULT PO), Take by mouth, Disp: , Rfl:   •  Multiple Vitamins-Minerals (PRESERVISION AREDS 2 PO), Take by mouth, Disp: , Rfl:     No Known Allergies         Vitals:    01/19/23 1259   BP: 159/96   Pulse: (!) 54       Objective:  Physical exam  · General: Awake, Alert, Oriented  · Eyes: Pupils equal, round and reactive to light  · Heart: regular rate and rhythm  · Lungs: No audible wheezing  · Abdomen: soft                    Ortho Exam  Right knee:  Well healed anterior incision  No erythema and no ecchymosis  Stable to varus and valgus stress  Extensor mechanism intact  Extension full  Flexion 130  Calf compartments soft and supple  Sensation intact  Toes are warm sensate and mobile      Diagnostics, reviewed and taken today if performed as documented: The attending physician has personally reviewed the pertinent films in PACS and interpretation is as follows:  Right knee x-ray:  Well aligned prosthesis with no acute changes  Procedures, if performed today:    Procedures    None performed      Portions of the record may have been created with voice recognition software  Occasional wrong word or "sound a like" substitutions may have occurred due to the inherent limitations of voice recognition software  Read the chart carefully and recognize, using context, where substitutions have occurred

## 2023-05-31 NOTE — OP NOTE
PERATIVE REPORT  PATIENT NAME: Princess Alegria    :  1960  MRN: [id number]  Pt Location: [facility] ROOM 18    SURGERY DATE: 1/10/2022    Surgeon(s) and Role:     * Kayleigh Mota MD - Primary     * Chanelle Rodrigez PA-C - Assisting     * Kelli Becerra MD - Assisting    Preop Diagnosis:  Primary osteoarthritis of right knee [M17 11]    Post-Op Diagnosis Codes:     * Primary osteoarthritis of right knee [M17 11]    Procedure(s) (LRB):  ARTHROPLASTY KNEE TOTAL W ROBOT (Right)    Specimen(s):  * No specimens in log *    Estimated Blood Loss:   50 mL    Drains:  Closed/Suction Drain Anterior;Right Knee Accordion 10 Fr  (Active)   Number of days: 0       Urethral Catheter Latex 16 Fr  (Active)   Number of days: 0       Anesthesia Type:   Spinal w/ Femoral Nerve Block    Operative Indications:  Primary osteoarthritis of right knee [M17 11]      Operative Findings:  depuy attune   Femur-7   polt-7   Tibia-7   [id number]    Complications:   None    Procedure and Technique: Following the induction adequate level of spinal anesthesia, Sinclair catheter sterilely introduced this patient's bladder  Antibiotics administered  The right thigh was then fitted with a thigh-high tourniquet  The right lower extremity then underwent sterile prep and drape  Right lower extremity was exsanguinated to gravity and the tourniquet inflated to 300 mmHg  A midline knee incision was created the knee in flexion  Full-thickness flaps raised in order to access the extensor mechanism  A medial arthrotomy was created open up the knee joint  Bony and soft tissue releases were performed where necessary  Two half pins were placed within the proximal tibia, 2 half-pins were placed with the distal femur  In doing so, modules low were created, and the rays were attached  Checkpoints made with the proximal tibia and distal femur    The knee was then taken through range of motion, this was with neutral force, varus, and finally with [Mother] : mother valgus force applied to it  Data was collected, and we balanced the soft tissue sleeves on the back computer  The plan was finalized  The robot was brought in and docked  The distal femur was approached 1st   The 1st maneuver of the distal femoral cut, followed by the anterior posterior cuts  Finally the chamfer cuts were done to complete the femur  The proximal tibia cut was made next  Care was taken to preserve the integrity medial collateral, lateral collateral, posterior structures during these maneuvers  The box cut was made for the posterior stabilized unit  Remnant medial lateral meniscectomies were then performed  Trials were inserted, the knee was taken through range of motion and found to be capable full extension, good flexion, and stable throughout  The patella was then resurfaced will utilize the manufacture's equipment, was found to be a size 38 mm button  The trial components removed and the knee was prepared for insertion of cemented components  The cemented tibia, cemented femur, trial poly, and cemented patella placed  Excess cement was removed, the knee was brought into extension  The cement was allowed to cure  The trial poly was taken out, the knee was packed off  The tourniquet was deflated, hemostasis was secured  The insert polyethylene was then snapped into position  The knee was taken through a final range of motion, and found to be capable full extension, good flexion, no mid flexion valgus instability, and excellent patellar tracking  Satisfied with the extent of surgery, the wounds then flushed with saline closed  A Betadine soak was initiated  A drain was placed deep brought out via separate lateral stab incision  The arthrotomy was closed number Vicryl suture  The subcu tissue closed 2 Vicryl suture  The skin was closed staples  Sterile dressings were applied    He was then transferred to recovery room stable condition plans to include physical therapy weight-bearing to tolerance, he will require DVT prophylaxis with Lovenox  Please note, that as no qualified orthopedic resident was available to assist, the assistance of Natanael Kate was instrumental in the safe execution of this patient's surgery    Started the patient position, patient prep drape, intraoperative assistance, wound closure, dressing application, and patient transfer, all these were performed under my direct supervision I was present for the entire procedure    Patient Disposition:  PACU       SIGNATURE: Gaye Fleming MD  DATE: January 10, 2022  TIME: 2:24 PM

## 2024-04-08 RX ORDER — FOLIC ACID 1 MG/1
TABLET ORAL
Qty: 30 TABLET | Refills: 0 | OUTPATIENT
Start: 2024-04-08

## 2024-04-08 RX ORDER — LORATADINE 10 MG
TABLET ORAL
Qty: 60 TABLET | Refills: 0 | OUTPATIENT
Start: 2024-04-08

## 2024-04-08 RX ORDER — FERROUS SULFATE 324(65)MG
324 TABLET, DELAYED RELEASE (ENTERIC COATED) ORAL
Qty: 30 TABLET | Refills: 0 | OUTPATIENT
Start: 2024-04-08 | End: 2024-05-08

## 2025-03-31 DIAGNOSIS — M25.561 RIGHT KNEE PAIN, UNSPECIFIED CHRONICITY: Primary | ICD-10-CM

## 2025-04-08 ENCOUNTER — HOSPITAL ENCOUNTER (OUTPATIENT)
Dept: RADIOLOGY | Facility: HOSPITAL | Age: 65
Discharge: HOME | End: 2025-04-08
Attending: ORTHOPAEDIC SURGERY
Payer: COMMERCIAL

## 2025-04-08 ENCOUNTER — OFFICE VISIT (OUTPATIENT)
Dept: OBGYN CLINIC | Facility: HOSPITAL | Age: 65
End: 2025-04-08
Payer: COMMERCIAL

## 2025-04-08 VITALS — HEIGHT: 72 IN | BODY MASS INDEX: 27.09 KG/M2 | WEIGHT: 200 LBS

## 2025-04-08 DIAGNOSIS — Z96.651 HISTORY OF TOTAL KNEE ARTHROPLASTY, RIGHT: ICD-10-CM

## 2025-04-08 DIAGNOSIS — M25.562 LEFT KNEE PAIN, UNSPECIFIED CHRONICITY: ICD-10-CM

## 2025-04-08 DIAGNOSIS — M25.561 RIGHT KNEE PAIN, UNSPECIFIED CHRONICITY: ICD-10-CM

## 2025-04-08 DIAGNOSIS — M17.12 PRIMARY OSTEOARTHRITIS OF LEFT KNEE: ICD-10-CM

## 2025-04-08 DIAGNOSIS — M70.51 PES ANSERINUS BURSITIS OF RIGHT KNEE: ICD-10-CM

## 2025-04-08 DIAGNOSIS — M25.562 LEFT KNEE PAIN, UNSPECIFIED CHRONICITY: Primary | ICD-10-CM

## 2025-04-08 PROCEDURE — 73560 X-RAY EXAM OF KNEE 1 OR 2: CPT

## 2025-04-08 PROCEDURE — 99214 OFFICE O/P EST MOD 30 MIN: CPT | Performed by: ORTHOPAEDIC SURGERY

## 2025-04-08 PROCEDURE — 73562 X-RAY EXAM OF KNEE 3: CPT

## 2025-04-08 PROCEDURE — 20610 DRAIN/INJ JOINT/BURSA W/O US: CPT | Performed by: ORTHOPAEDIC SURGERY

## 2025-04-08 RX ORDER — LIDOCAINE HYDROCHLORIDE 10 MG/ML
2 INJECTION, SOLUTION INFILTRATION; PERINEURAL
Status: COMPLETED | OUTPATIENT
Start: 2025-04-08 | End: 2025-04-08

## 2025-04-08 RX ORDER — BUPIVACAINE HYDROCHLORIDE 2.5 MG/ML
2 INJECTION, SOLUTION INFILTRATION; PERINEURAL
Status: COMPLETED | OUTPATIENT
Start: 2025-04-08 | End: 2025-04-08

## 2025-04-08 RX ORDER — BETAMETHASONE SODIUM PHOSPHATE AND BETAMETHASONE ACETATE 3; 3 MG/ML; MG/ML
12 INJECTION, SUSPENSION INTRA-ARTICULAR; INTRALESIONAL; INTRAMUSCULAR; SOFT TISSUE
Status: COMPLETED | OUTPATIENT
Start: 2025-04-08 | End: 2025-04-08

## 2025-04-08 RX ADMIN — LIDOCAINE HYDROCHLORIDE 2 ML: 10 INJECTION, SOLUTION INFILTRATION; PERINEURAL at 14:45

## 2025-04-08 RX ADMIN — BUPIVACAINE HYDROCHLORIDE 2 ML: 2.5 INJECTION, SOLUTION INFILTRATION; PERINEURAL at 14:45

## 2025-04-08 RX ADMIN — BETAMETHASONE SODIUM PHOSPHATE AND BETAMETHASONE ACETATE 12 MG: 3; 3 INJECTION, SUSPENSION INTRA-ARTICULAR; INTRALESIONAL; INTRAMUSCULAR; SOFT TISSUE at 14:45

## 2025-04-08 NOTE — PROGRESS NOTES
Encounter Provider: Faustino Ruby MD   Encounter Date: 04/08/25  Encounter department: [facility] [place]         ASSESSMENT & PLAN:  Assessment & Plan  Left knee pain, unspecified chronicity  See other diagnoses     Right knee pain, unspecified chronicity  See other diagnoses     Primary osteoarthritis of left knee  See other diagnoses     Pes anserinus bursitis of right knee  See other diagnoses     History of total knee arthroplasty, right  Hx right TKA 1/10/2022  Current symptoms of resolving right medial knee pain and occasional left medial knee pain  The idea of total joint arthroplasty was discussed as a reasonable treatment option if/when conservative care no longer provides meaningful relief and symptoms are unbearable.  The patient was provided with left knee steroid injection.  The patient tolerated the procedure well.    The patient can follow up as needed and is welcome to return at any point with any new or old issue.       Other orders    Large joint arthrocentesis      To do next visit:  Return if symptoms worsen or fail to improve.    Scribe Attestation      I,:  Jason Cruz MA am acting as a scribe while in the presence of the attending physician.:       I,:  Faustino Ruby MD personally performed the services described in this documentation    as scribed in my presence.:             ____________________________________________________  CHIEF COMPLAINT:  Chief Complaint   Patient presents with    Right Knee - Follow-up         SUBJECTIVE:  Kelton Pederson is a 65 y.o. male who presents for follow up of right knee with history of right TKA, 1/10/2022.   He has been flared for several weeks with no injury.  Today he complains of right anteromedial knee pain.  Prolonged walking aggravates while rest alleviates.  He will use otc medications as needed for pain.          PAST MEDICAL HISTORY:  Past Medical History:   Diagnosis Date    Fractures      "History of transfusion     Hypertension        PAST SURGICAL HISTORY:  Past Surgical History:   Procedure Laterality Date    ABDOMINAL SURGERY      FL INJECTION RIGHT KNEE (ARTHROGRAM)  3/12/2013    KNEE ARTHROSCOPY      KNEE SURGERY      MD ARTHRP KNE CONDYLE&PLATU MEDIAL&LAT COMPARTMENTS Right 1/10/2022    Procedure: ARTHROPLASTY KNEE TOTAL W ROBOT;  Surgeon: Faustino Ruby MD;  Location: [facility];  Service: Orthopedics    ROTATOR CUFF REPAIR      SHOULDER SURGERY      SPLENECTOMY         FAMILY HISTORY:  No family history on file.    SOCIAL HISTORY:  Social History     Tobacco Use    Smoking status: Former    Smokeless tobacco: Never   Vaping Use    Vaping status: Never Used   Substance Use Topics    Alcohol use: Yes     Alcohol/week: 2.0 standard drinks of alcohol     Types: 1 Cans of beer, 1 Shots of liquor per week     Comment: daily after dinner    Drug use: Yes     Types: Marijuana     Comment: CBD gummies       MEDICATIONS:    Current Outpatient Medications:     LISINOPRIL PO, , Disp: , Rfl:     Multiple Vitamin (MULTIVITAMIN ADULT PO), Take by mouth, Disp: , Rfl:     Multiple Vitamins-Minerals (PRESERVISION AREDS 2 PO), Take by mouth, Disp: , Rfl:     ALLERGIES:  No Known Allergies    LABS:  HgA1c:   Lab Results   Component Value Date    HGBA1C 5.4 12/13/2021     BMP:   Lab Results   Component Value Date    CALCIUM 9.6 05/06/2024    K 4.9 05/06/2024    CO2 26 05/06/2024     05/06/2024    BUN 20 05/06/2024    CREATININE 0.95 05/06/2024     CBC: No components found for: \"CBC\"    _____________________________________________________  PHYSICAL EXAMINATION:  Vital signs: Ht 6' (1.829 m)   Wt 90.7 kg (200 lb)   BMI 27.12 kg/m²   General: No acute distress, awake and alert  Psychiatric: Mood and affect appear appropriate  HEENT: Trachea Midline, No torticollis, no apparent facial trauma  Cardiovascular: No audible murmurs; Extremities appear perfused  Pulmonary: No audible wheezing or " "stridor  Skin: No open lesions; see further details (if any) below    Ortho Exam:  Right knee:  TTP over pes anserine  Well healed anterior incision  No erythema and no ecchymosis  Stable to varus and valgus stress  Extensor mechanism intact  Extension 0  Flexion 130  Calf compartments soft and supple  Sensation intact  Toes are warm sensate and mobile      Left knee:  No erythema or ecchymosis  No effusion or swelling  Normal strength  Good ROM with crepitus   Calf compartments soft and supple  Sensation intact  Toes are warm sensate and mobile          _____________________________________________________  STUDIES REVIEWED:    The attending physician has personally reviewed the pertinent films in PACS and interpretation is as follows:  Right knee x-ray:  Well positioned prosthesis with no acute changes.  No signs of hardware loosening or failure.      Left knee x-ray:  Moderate medial and mild-moderate lateral and patellofemoral arthritic changes.      PROCEDURES PERFORMED:  Large joint arthrocentesis: L knee  Universal Protocol:  Consent: Verbal consent obtained.  Risks and benefits: risks, benefits and alternatives were discussed  Consent given by: patient  Time out: Immediately prior to procedure a \"time out\" was called to verify the correct patient, procedure, equipment, support staff and site/side marked as required.  Timeout called at: 4/8/2025 3:35 PM.  Patient understanding: patient states understanding of the procedure being performed  Site marked: the operative site was marked  Patient identity confirmed: verbally with patient  Supporting Documentation  Indications: pain   Procedure Details  Location: knee - L knee  Preparation: Patient was prepped and draped in the usual sterile fashion  Needle size: 22 G  Ultrasound guidance: no  Approach: anterolateral  Medications administered: 12 mg betamethasone acetate-betamethasone sodium phosphate 6 (3-3) mg/mL; 2 mL bupivacaine 0.25 %; 2 mL lidocaine 1 " "%    Patient tolerance: patient tolerated the procedure well with no immediate complications  Dressing:  Sterile dressing applied                  Portions of the record may have been created with voice recognition software.  Occasional wrong word or \"sound a like\" substitutions may have occurred due to the inherent limitations of voice recognition software.  Read the chart carefully and recognize, using context, where substitutions have occurred.        "

## (undated) DEVICE — SPINNING CEMENT MIXING BOWL

## (undated) DEVICE — SUT VICRYL PLUS 2-0 CTB-1 27 IN VCPB259H

## (undated) DEVICE — JP 3-SPRING RES W/10FR PVC DRAIN/TR: Brand: CARDINAL HEALTH

## (undated) DEVICE — GLOVE INDICATOR PI UNDERGLOVE SZ 8.5 BLUE

## (undated) DEVICE — VELYS ROBOT-ASSISTED SOLUTION ARRAY DRILL PIN 125 MM X 4 MM: Brand: VELYS

## (undated) DEVICE — 3M™ IOBAN™ 2 ANTIMICROBIAL INCISE DRAPE 6650EZ: Brand: IOBAN™ 2

## (undated) DEVICE — TRAY FOLEY 16FR URIMETER SURESTEP

## (undated) DEVICE — SILVER-COATED ANTIMICROBIAL BARRIER DRESSING: Brand: ACTICOAT   4" X 8"

## (undated) DEVICE — BETHLEHEM UNIV TOTAL KNEE, KIT: Brand: CARDINAL HEALTH

## (undated) DEVICE — PLUMEPEN PRO 10FT

## (undated) DEVICE — NO-SCRATCH ™ SMALL WHITNEY CURETTE ™ IS A SINGLE-USE, PLASTIC CURETTE FOR QUICKLY APPLYING, MANIPULATING AND REMOVING BONE CEMENT DURING HIP AND KNEE REPLACEMENT SURGERY. THE PLASTIC IS SOFTER THAN STEEL INSTRUMENTS, REDUCING THE RISK OF DAMAGING THE PROSTHESIS WITH METAL INSTRUMENTS.  THE CURETTE’S 6MM TIP REMOVES EXCESS CEMENT FROM REPLACEMENT HIPS AND KNEES. EASY-TO-MANEUVER, THE SMALL BLUE CURETTE LETS YOU REMOVE CEMENT FROM ALL EDGES OF THE PROSTHESIS.NO-SCRATCH WHITNEY SMALL CURETTE FEATURES:SAFER THAN STEEL- MADE OF PLASTIC - STURDY YET SOFTER THAN SURGICAL STEEL.HANDIER- EACH TOOL HAS A MOLDED-IN THUMB INDENTATION INSTANTLY ORIENTING THE TOOL.- EASIER TO MANEUVER IN HARD TO SEE PLACES.- COLOR-CODED FOR EASY IDENTIFICATION.FASTER- COMES INDIVIDUALLY PACKAGED IN STERILE, PEEL OPEN POUCH, READY TO GO.- APPLIES, MANIPULATES, OR REMOVES CEMENT WITH FINGERTIP PRECISION.ECONOMICAL- THE COST OF A SINGLE REVISION DWARFS THE COST OF A SINGLE-USE CURETTE. - DISPOSABLE – THERE’S NO NEED TO WASTE TIME REMOVING HARDENED CEMENT OR RE-STERILIZING TOOLS.- LESS EXPENSIVE TO BUY AND INVENTORY - ORDER ONLY THE TOOL YOU USE.- PACKAGED 25 INDIVIDUALLY WRAPPED TOOLS TO A CARTON FOR CONVENIENT SHELF STORAGE.: Brand: WHITNEY NO-SCRATCH CURETTE (SMALL)

## (undated) DEVICE — GAUZE SPONGES,16 PLY: Brand: CURITY

## (undated) DEVICE — ACE WRAP 6 IN STERILE

## (undated) DEVICE — VELYS BLADE SAW OSC 85 X 19 X 2MM

## (undated) DEVICE — PADDING CAST 4 IN  COTTON STRL

## (undated) DEVICE — SUT VICRYL PLUS 1 CTB-1 36 IN VCPB947H

## (undated) DEVICE — GLOVE SRG BIOGEL 8

## (undated) DEVICE — VELYS SATEL DRAPE

## (undated) DEVICE — DRAPE EQUIPMENT RF WAND

## (undated) DEVICE — COOL TEMP PAD

## (undated) DEVICE — STOCKINETTE REGULAR

## (undated) DEVICE — DRAPE SHEET X-LG

## (undated) DEVICE — VELYS ROBOT DRAPE

## (undated) DEVICE — RECIPROCATING BLADE, DOUBLE SIDED, OFFSET  (70.0 X 0.64 X 12.6MM)

## (undated) DEVICE — CAPIT KNEE ATTUNE FB CEMENT - DEPUY

## (undated) DEVICE — ABDOMINAL PAD: Brand: DERMACEA

## (undated) DEVICE — CUFF TOURNIQUET 30 X 4 IN QUICK CONNECT DISP 1BLA

## (undated) DEVICE — HOOD: Brand: FLYTE, SURGICOOL

## (undated) DEVICE — VELYS ROBOTIC-ASSISTED SOLUTION ARRAY SET - KNEE: Brand: VELYS

## (undated) DEVICE — DUAL CUT SAGITTAL BLADE

## (undated) DEVICE — SPONGE PVP SCRUB WING STERILE

## (undated) DEVICE — THE SIMPULSE SOLO SYSTEM WITH ULTREX RETRACTABLE SPLASH SHIELD TIP: Brand: SIMPULSE SOLO